# Patient Record
Sex: FEMALE | Race: WHITE | NOT HISPANIC OR LATINO | Employment: FULL TIME | URBAN - METROPOLITAN AREA
[De-identification: names, ages, dates, MRNs, and addresses within clinical notes are randomized per-mention and may not be internally consistent; named-entity substitution may affect disease eponyms.]

---

## 2017-01-01 ENCOUNTER — HOSPITAL ENCOUNTER (EMERGENCY)
Facility: CLINIC | Age: 27
Discharge: HOME | End: 2017-01-01
Attending: EMERGENCY MEDICINE | Admitting: EMERGENCY MEDICINE
Payer: COMMERCIAL

## 2017-01-01 ENCOUNTER — APPOINTMENT (OUTPATIENT)
Dept: CT IMAGING | Facility: CLINIC | Age: 27
End: 2017-01-01
Attending: EMERGENCY MEDICINE
Payer: COMMERCIAL

## 2017-01-01 VITALS
TEMPERATURE: 97.5 F | OXYGEN SATURATION: 98 % | BODY MASS INDEX: 29.26 KG/M2 | DIASTOLIC BLOOD PRESSURE: 70 MMHG | WEIGHT: 160 LBS | SYSTOLIC BLOOD PRESSURE: 107 MMHG | RESPIRATION RATE: 18 BRPM | HEART RATE: 105 BPM

## 2017-01-01 DIAGNOSIS — R11.2 NON-INTRACTABLE VOMITING WITH NAUSEA, UNSPECIFIED VOMITING TYPE: ICD-10-CM

## 2017-01-01 DIAGNOSIS — R11.10 VOMITING AND DIARRHEA: ICD-10-CM

## 2017-01-01 DIAGNOSIS — R10.30 LOWER ABDOMINAL PAIN: ICD-10-CM

## 2017-01-01 DIAGNOSIS — R19.7 VOMITING AND DIARRHEA: ICD-10-CM

## 2017-01-01 LAB
ALBUMIN UR-MCNC: 10 MG/DL
ANION GAP SERPL CALCULATED.3IONS-SCNC: 9 MMOL/L (ref 3–14)
APPEARANCE UR: ABNORMAL
BACTERIA #/AREA URNS HPF: ABNORMAL /HPF
BASOPHILS # BLD AUTO: 0 10E9/L (ref 0–0.2)
BASOPHILS NFR BLD AUTO: 0.3 %
BILIRUB UR QL STRIP: NEGATIVE
BUN SERPL-MCNC: 15 MG/DL (ref 7–30)
CALCIUM SERPL-MCNC: 8.6 MG/DL (ref 8.5–10.1)
CHLORIDE SERPL-SCNC: 100 MMOL/L (ref 94–109)
CO2 SERPL-SCNC: 28 MMOL/L (ref 20–32)
COLOR UR AUTO: YELLOW
CREAT SERPL-MCNC: 0.8 MG/DL (ref 0.52–1.04)
DIFFERENTIAL METHOD BLD: ABNORMAL
EOSINOPHIL # BLD AUTO: 0.1 10E9/L (ref 0–0.7)
EOSINOPHIL NFR BLD AUTO: 0.6 %
ERYTHROCYTE [DISTWIDTH] IN BLOOD BY AUTOMATED COUNT: 12.3 % (ref 10–15)
GFR SERPL CREATININE-BSD FRML MDRD: 87 ML/MIN/1.7M2
GLUCOSE SERPL-MCNC: 95 MG/DL (ref 70–99)
GLUCOSE UR STRIP-MCNC: NEGATIVE MG/DL
HCG UR QL: NEGATIVE
HCT VFR BLD AUTO: 43.9 % (ref 35–47)
HGB BLD-MCNC: 14.6 G/DL (ref 11.7–15.7)
HGB UR QL STRIP: NEGATIVE
IMM GRANULOCYTES # BLD: 0.1 10E9/L (ref 0–0.4)
IMM GRANULOCYTES NFR BLD: 0.4 %
KETONES UR STRIP-MCNC: 5 MG/DL
LEUKOCYTE ESTERASE UR QL STRIP: ABNORMAL
LYMPHOCYTES # BLD AUTO: 0.5 10E9/L (ref 0.8–5.3)
LYMPHOCYTES NFR BLD AUTO: 4.1 %
MCH RBC QN AUTO: 31.5 PG (ref 26.5–33)
MCHC RBC AUTO-ENTMCNC: 33.3 G/DL (ref 31.5–36.5)
MCV RBC AUTO: 95 FL (ref 78–100)
MONOCYTES # BLD AUTO: 0.3 10E9/L (ref 0–1.3)
MONOCYTES NFR BLD AUTO: 3 %
MUCOUS THREADS #/AREA URNS LPF: PRESENT /LPF
NEUTROPHILS # BLD AUTO: 10.5 10E9/L (ref 1.6–8.3)
NEUTROPHILS NFR BLD AUTO: 91.6 %
NITRATE UR QL: NEGATIVE
NRBC # BLD AUTO: 0 10*3/UL
NRBC BLD AUTO-RTO: 0 /100
PH UR STRIP: 5.5 PH (ref 5–7)
PLATELET # BLD AUTO: 248 10E9/L (ref 150–450)
POTASSIUM SERPL-SCNC: 3.7 MMOL/L (ref 3.4–5.3)
RBC # BLD AUTO: 4.63 10E12/L (ref 3.8–5.2)
RBC #/AREA URNS AUTO: 5 /HPF (ref 0–2)
SODIUM SERPL-SCNC: 137 MMOL/L (ref 133–144)
SP GR UR STRIP: 1.02 (ref 1–1.03)
SQUAMOUS #/AREA URNS AUTO: 6 /HPF (ref 0–1)
URN SPEC COLLECT METH UR: ABNORMAL
UROBILINOGEN UR STRIP-MCNC: NORMAL MG/DL (ref 0–2)
WBC # BLD AUTO: 11.5 10E9/L (ref 4–11)
WBC #/AREA URNS AUTO: 3 /HPF (ref 0–2)

## 2017-01-01 PROCEDURE — 96374 THER/PROPH/DIAG INJ IV PUSH: CPT

## 2017-01-01 PROCEDURE — 96361 HYDRATE IV INFUSION ADD-ON: CPT

## 2017-01-01 PROCEDURE — 81025 URINE PREGNANCY TEST: CPT | Performed by: EMERGENCY MEDICINE

## 2017-01-01 PROCEDURE — 85025 COMPLETE CBC W/AUTO DIFF WBC: CPT | Performed by: EMERGENCY MEDICINE

## 2017-01-01 PROCEDURE — 25000125 ZZHC RX 250: Performed by: EMERGENCY MEDICINE

## 2017-01-01 PROCEDURE — 99285 EMERGENCY DEPT VISIT HI MDM: CPT | Mod: 25

## 2017-01-01 PROCEDURE — 96375 TX/PRO/DX INJ NEW DRUG ADDON: CPT

## 2017-01-01 PROCEDURE — 25500064 ZZH RX 255 OP 636: Performed by: EMERGENCY MEDICINE

## 2017-01-01 PROCEDURE — 25000128 H RX IP 250 OP 636: Performed by: EMERGENCY MEDICINE

## 2017-01-01 PROCEDURE — 81001 URINALYSIS AUTO W/SCOPE: CPT | Performed by: EMERGENCY MEDICINE

## 2017-01-01 PROCEDURE — 80048 BASIC METABOLIC PNL TOTAL CA: CPT | Performed by: EMERGENCY MEDICINE

## 2017-01-01 PROCEDURE — 74177 CT ABD & PELVIS W/CONTRAST: CPT

## 2017-01-01 RX ORDER — HYDROMORPHONE HYDROCHLORIDE 1 MG/ML
0.5 INJECTION, SOLUTION INTRAMUSCULAR; INTRAVENOUS; SUBCUTANEOUS ONCE
Status: COMPLETED | OUTPATIENT
Start: 2017-01-01 | End: 2017-01-01

## 2017-01-01 RX ORDER — ONDANSETRON 2 MG/ML
4 INJECTION INTRAMUSCULAR; INTRAVENOUS ONCE
Status: COMPLETED | OUTPATIENT
Start: 2017-01-01 | End: 2017-01-01

## 2017-01-01 RX ORDER — ONDANSETRON 4 MG/1
4 TABLET, ORALLY DISINTEGRATING ORAL EVERY 8 HOURS PRN
Qty: 10 TABLET | Refills: 0 | Status: SHIPPED | OUTPATIENT
Start: 2017-01-01 | End: 2017-01-04

## 2017-01-01 RX ORDER — IOPAMIDOL 755 MG/ML
500 INJECTION, SOLUTION INTRAVASCULAR ONCE
Status: COMPLETED | OUTPATIENT
Start: 2017-01-01 | End: 2017-01-01

## 2017-01-01 RX ADMIN — SODIUM CHLORIDE 60 ML: 9 INJECTION, SOLUTION INTRAVENOUS at 22:13

## 2017-01-01 RX ADMIN — SODIUM CHLORIDE 1000 ML: 9 INJECTION, SOLUTION INTRAVENOUS at 21:09

## 2017-01-01 RX ADMIN — HYDROMORPHONE HYDROCHLORIDE 0.5 MG: 1 INJECTION, SOLUTION INTRAMUSCULAR; INTRAVENOUS; SUBCUTANEOUS at 21:09

## 2017-01-01 RX ADMIN — IOPAMIDOL 81 ML: 755 INJECTION, SOLUTION INTRAVENOUS at 22:13

## 2017-01-01 RX ADMIN — ONDANSETRON 4 MG: 2 INJECTION INTRAMUSCULAR; INTRAVENOUS at 21:09

## 2017-01-01 ASSESSMENT — ENCOUNTER SYMPTOMS
NAUSEA: 1
DIARRHEA: 1
VOMITING: 1
FEVER: 0
BLOOD IN STOOL: 0
ABDOMINAL PAIN: 1
DYSURIA: 0

## 2017-01-01 NOTE — ED AVS SNAPSHOT
[facility] Emergency Department    [address]    [facility] [address]    Phone:  [phone]    Fax:  [phone]                                       Althea Stewart   MRN: [id number]    Department:  [facility] Emergency Department   Date of Visit:  1/1/2017           After Visit Summary Signature Page     I have received my discharge instructions, and my questions have been answered. I have discussed any challenges I see with this plan with the nurse or doctor.    ..........................................................................................................................................  Patient/Patient Representative Signature      ..........................................................................................................................................  Patient Representative Print Name and Relationship to Patient    ..................................................               ................................................  Date                                            Time    ..........................................................................................................................................  Reviewed by Signature/Title    ...................................................              ..............................................  Date                                                            Time

## 2017-01-01 NOTE — ED AVS SNAPSHOT
[facility] Emergency Department    [address]    [facility] [address]    Phone:  [phone]    Fax:  [phone]                                       Althea Stewart   MRN: [id number]    Department:  [facility] Emergency Department   Date of Visit:  1/1/2017           Patient Information     Date Of Birth          1990        Your diagnoses for this visit were:     Non-intractable vomiting with nausea, unspecified vomiting type     Vomiting and diarrhea     Lower abdominal pain        You were seen by Daisha Blanco MD.      Follow-up Information     Follow up with Shirley Siu MD In 3 days.    Specialty:  Internal Medicine    Contact information:    [facility]  [address]  [facility] [id number] [phone]          Discharge Instructions       Please follow up closely with your regular physician. Please return to the ED if your symptoms worsen or if you develop new or concerning symptoms.       Discharge Instructions  Vomiting    You have been seen today for vomiting. This is usually caused by a virus, but some bacteria, parasites, medicines or other medical conditions can cause similar symptoms. At this time your doctor does not find that your vomiting is a sign of anything dangerous or life-threatening. However, sometimes the signs of serious illness do not show up right away. If you have new or worse symptoms, you may need to be seen again in the emergency department or by your primary doctor. Remember that serious problems like appendicitis can start as vomiting.     Return to the Emergency Department if:    You keep throwing up and you are not able to keep liquids down.     You feel you are getting dehydrated, such as being very thirsty, not urinating at least every 8-12 hours, or feeling faint or lightheaded.     You develop a new fever, or your fever continues for more than 2 days.     You have belly pain that  seems worse than cramps, is in one spot, or is getting worse over time.     You have blood in your vomit or stools.     You feel very weak.    You are not starting to improve within 24 hours of your visit here.     What can I do to help myself?    The most important thing to do is to drink clear liquids. If you have been vomiting a lot, it is best to have only small, frequent sips of liquids. Drinking too much at once may cause more vomiting. If you are vomiting often, you must replace minerals, sodium and potassium lost with your illness. Pedialyte  and sports drinks can help you replace these minerals. You can also drink clear liquids such as water, weak tea, apple juice, and 7-Up . Avoid acid liquids (orange), caffeine (coffee) or alcohol. Do not drink milk until you no longer have diarrhea.     After liquids are staying down, you may start eating mild foods. Soda crackers, toast, plain noodles, gelatin, applesauce and bananas are good first choices. Avoid foods that have acid, are spicy, fatty or have a lot of fiber (such as meats, coarse grains, vegetables). You may start eating these foods again in about 3 days when you are better.     Sometimes treatment includes prescription medicine to prevent nausea and vomiting. If your doctor prescribes these for you, take them as directed.     Don t take ibuprofen, or other nonsteroidal anti-inflammatory medicines without checking with your healthcare provider.   If you were given a prescription for medicine here today, be sure to read all of the information (including the package insert) that comes with your prescription.  This will include important information about the medicine, its side effects, and any warnings that you need to know about.  The pharmacist who fills the prescription can provide more information and answer questions you may have about the medicine.  If you have questions or concerns that the pharmacist cannot address, please call or return to the  Emergency Department.       Opioid Medication Information    Pain medications are among the most commonly prescribed medicines, so we are including this information for all our patients. If you did not receive pain medication or get a prescription for pain medicine, you can ignore it.     You may have been given a prescription for an opioid (narcotic) pain medicine and/or have received a pain medicine while here in the Emergency Department. These medicines can make you drowsy or impaired. You must not drive, operate dangerous equipment, or engage in any other dangerous activities while taking these medications. If you drive while taking these medications, you could be arrested for DUI, or driving under the influence. Do not drink any alcohol while you are taking these medications.     Opioid pain medications can cause addiction. If you have a history of chemical dependency of any type, you are at a higher risk of becoming addicted to pain medications.  Only take these prescribed medications to treat your pain when all other options have been tried. Take it for as short a time and as few doses as possible. Store your pain pills in a secure place, as they are frequently stolen and provide a dangerous opportunity for children or visitors in your house to start abusing these powerful medications. We will not replace any lost or stolen medicine.  As soon as your pain is better, you should flush all your remaining medication.     Many prescription pain medications contain Tylenol  (acetaminophen), including Vicodin , Tylenol #3 , Norco , Lortab , and Percocet .  You should not take any extra pills of Tylenol  if you are using these prescription medications or you can get very sick.  Do not ever take more than 3000 mg of acetaminophen in any 24 hour period.    All opioids tend to cause constipation. Drink plenty of water and eat foods that have a lot of fiber, such as fruits, vegetables, prune juice, apple juice and high  fiber cereal.  Take a laxative if you don t move your bowels at least every other day. Miralax , Milk of Magnesia, Colace , or Senna  can be used to keep you regular.      Remember that you can always come back to the Emergency Department if you are not able to see your regular doctor in the amount of time listed above, if you get any new symptoms, or if there is anything that worries you.      Discharge Instructions  Adult Diarrhea    You have been seen today for diarrhea. This is usually caused by a virus, but some bacteria, parasites, medicines or other medical conditions can cause similar symptoms. At this time your doctor does not find that your diarrhea is a sign of anything dangerous or life-threatening. However, sometimes the signs of serious illness do not show up right away. If you have new or worse symptoms, you may need to be seen again in the Emergency Department or by your primary doctor.     Return to the Emergency Department if:    You feel you are getting dehydrated, such as being very thirsty, not urinating at least every 8-12 hours, or feeling faint or lightheaded.     You develop a new fever, or your fever continues for more than 2 days.     You have belly pain that seems worse than cramps, is in one spot, or is getting worse over time.     You have blood in your stool or your stool becomes black.  (Remember that if you take Pepto-Bismol , this will turn your stool black).     You feel very weak.    You are not starting to improve within 24 hours of your visit here.    What can I do to help myself?    The most important thing to do is to drink clear liquids.   It is best to have only small, frequent sips of liquids. Drinking too much at once may cause more diarrhea. You should also replace minerals, sodium and potassium lost with diarrhea. Pedialyte  and sports drinks can help you replace these minerals. You can also drink clear liquids such as water, weak tea, apple juice, and 7-Up . Avoid acid  "liquids (orange), caffeine (coffee) or alcohol. Milk products will make the diarrhea worse.     Eat only bland foods. Soda crackers, toast, plain noodles, gelatin, applesauce and bananas are good first choices. Avoid foods that have acid, are spicy, fatty or fibrous (such as meats, coarse grains, vegetables). You may start eating these foods again in about 3 days when you are better.     Sometimes treatment includes prescription medicine to prevent diarrhea. If your doctor prescribes these for you, take them as directed.     Nonprescription medicine is available for the treatment of diarrhea and can be very effective. If you use it, make sure you use the dose recommended on the package. Check with your healthcare provider before you use any medicine for diarrhea.     Don t take ibuprofen, or other nonsteroidal anti-inflammatory medicines without checking with your healthcare provider.   Probiotics: If you have been given an antibiotic, you may want to also take a probiotic pill or eat yogurt with live cultures. Probiotics have \"good bacteria\" to help your intestines stay healthy. Studies have shown that probiotics help prevent diarrhea and other intestine problems (including C. diff infection) when you take antibiotics. You can buy these without a prescription in the pharmacy section of the store.   If you were given a prescription for medicine here today, be sure to read all of the information (including the package insert) that comes with your prescription.  This will include important information about the medicine, its side effects, and any warnings that you need to know about.  The pharmacist who fills the prescription can provide more information and answer questions you may have about the medicine.  If you have questions or concerns that the pharmacist cannot address, please call or return to the Emergency Department.   Opioid Medication Information    Pain medications are among the most commonly prescribed " medicines, so we are including this information for all our patients. If you did not receive pain medication or get a prescription for pain medicine, you can ignore it.     You may have been given a prescription for an opioid (narcotic) pain medicine and/or have received a pain medicine while here in the Emergency Department. These medicines can make you drowsy or impaired. You must not drive, operate dangerous equipment, or engage in any other dangerous activities while taking these medications. If you drive while taking these medications, you could be arrested for DUI, or driving under the influence. Do not drink any alcohol while you are taking these medications.     Opioid pain medications can cause addiction. If you have a history of chemical dependency of any type, you are at a higher risk of becoming addicted to pain medications.  Only take these prescribed medications to treat your pain when all other options have been tried. Take it for as short a time and as few doses as possible. Store your pain pills in a secure place, as they are frequently stolen and provide a dangerous opportunity for children or visitors in your house to start abusing these powerful medications. We will not replace any lost or stolen medicine.  As soon as your pain is better, you should flush all your remaining medication.     Many prescription pain medications contain Tylenol  (acetaminophen), including Vicodin , Tylenol #3 , Norco , Lortab , and Percocet .  You should not take any extra pills of Tylenol  if you are using these prescription medications or you can get very sick.  Do not ever take more than 3000 mg of acetaminophen in any 24 hour period.    All opioids tend to cause constipation. Drink plenty of water and eat foods that have a lot of fiber, such as fruits, vegetables, prune juice, apple juice and high fiber cereal.  Take a laxative if you don t move your bowels at least every other day. Miralax , Milk of Magnesia,  Colace , or Senna  can be used to keep you regular.      Remember that you can always come back to the Emergency Department if you are not able to see your regular doctor in the amount of time listed above, if you get any new symptoms, or if there is anything that worries you.    Discharge Instructions  Abdominal Pain    Abdominal pain can be caused by many things. Your evaluation today does not show the exact cause for your pain. Your doctor today has decided that it is unlikely your pain is due to a life threatening problem, or a problem requiring surgery or hospital admission. Sometimes those problems cannot be found right away, so it is very important that you follow up as directed.  Sometimes only the changes which occur over time allow the cause of your pain to be found.    Return to the Emergency Department for a recheck in 8-12 hours if your pain continues.  If your pain gets worse, changes in location, or feels different, return to the Emergency Department right away.    ADULTS:  Return to the Emergency Department right away if:      You get an oral temperature above 102oF or as directed by your doctor.    You have blood in your stools (bright red or black, tarry stools).    You keep throwing up or can t drink liquids.    You see blood when you throw up.    You can t have a bowel movement or you can t pass gas.    Your stomach gets bloated or bigger.    Your skin or the whites of your eyes look yellow.    You faint.    You have bloody, frequent or painful urination.    You have new symptoms or anything that worries you.    CHILDREN:  Return to the Emergency Department right away if your child has any of the above-listed symptoms or the following:      Pushes your hand away or screams/cries when his/her belly is touched.    You notice your child is very fussy or weak.    Your child is very tired and is too tired to eat or drink.    Your child is dehydrated.  Signs of dehydration can be:  o Your infant has  had no wet diapers in 4-5 hours.  o Your older child has not passed urine in 6-8 hours.  o Your infant or child starts to have dry mouth and lips, or no saliva or tears.    PREGNANT WOMEN:  Return to the Emergency Department right away if you have any of the above-listed symptoms or the following:      You have bleeding, leaking fluid or passing tissue from the vagina.    You have worse pain or cramping, or pain in your shoulder or back.    You have vomiting that will not stop.    You have painful or bloody urination.    You have a temperature of 100oF or more.    Your baby is not moving as much as usual.    You faint.    You get a bad headache with or without eye problems and abdominal pain.    You have a convulsion or seizure.    You have unusual discharge from your vagina and abdominal pain.    Abdominal pain is pretty common during pregnancy.  Your pain may or may not be related to your pregnancy. You should follow-up closely with your OB doctor so they can evaluate you and your baby.  Until you follow-up with your regular doctor, do the following:       Avoid sex and do not put anything in your vagina.    Drink clear fluids.    Only take medications approved by your doctor.    MORE INFORMATION:    Appendicitis:  A possible cause of abdominal pain in any person who still has their appendix is acute appendicitis. Appendicitis is often hard to diagnose.  Testing does not always rule out early appendicitis or other causes of abdominal pain. Close follow-up with your doctor and re-evaluations may be needed to figure out the reason for your abdominal pain.    Follow-up:  It is very important that you make an appointment with your clinic and go to the appointment.  If you do not follow-up with your primary doctor, it may result in missing an important development which could result in permanent injury or disability and/or lasting pain.  If there is any problem keeping your appointment, call your doctor or return to  "the Emergency Department.    Medications:  Take your medications as directed by your doctor today.  Before using over-the-counter medications, ask your doctor and make sure to take the medications as directed.  If you have any questions about medications, ask your doctor.    Diet:  Resume your normal diet as much as possible, but do not eat fried, fatty or spicy foods while you have pain.  Do not drink alcohol or have caffeine.  Do not smoke tobacco.    Probiotics: If you have been given an antibiotic, you may want to also take a probiotic pill or eat yogurt with live cultures. Probiotics have \"good bacteria\" to help your intestines stay healthy. Studies have shown that probiotics help prevent diarrhea and other intestine problems (including C. diff infection) when you take antibiotics. You can buy these without a prescription in the pharmacy section of the store.     If you were given a prescription for medicine here today, be sure to read all of the information (including the package insert) that comes with your prescription.  This will include important information about the medicine, its side effects, and any warnings that you need to know about.  The pharmacist who fills the prescription can provide more information and answer questions you may have about the medicine.  If you have questions or concerns that the pharmacist cannot address, please call or return to the Emergency Department.         Opioid Medication Information    Pain medications are among the most commonly prescribed medicines, so we are including this information for all our patients. If you did not receive pain medication or get a prescription for pain medicine, you can ignore it.     You may have been given a prescription for an opioid (narcotic) pain medicine and/or have received a pain medicine while here in the Emergency Department. These medicines can make you drowsy or impaired. You must not drive, operate dangerous equipment, or engage " in any other dangerous activities while taking these medications. If you drive while taking these medications, you could be arrested for DUI, or driving under the influence. Do not drink any alcohol while you are taking these medications.     Opioid pain medications can cause addiction. If you have a history of chemical dependency of any type, you are at a higher risk of becoming addicted to pain medications.  Only take these prescribed medications to treat your pain when all other options have been tried. Take it for as short a time and as few doses as possible. Store your pain pills in a secure place, as they are frequently stolen and provide a dangerous opportunity for children or visitors in your house to start abusing these powerful medications. We will not replace any lost or stolen medicine.  As soon as your pain is better, you should flush all your remaining medication.     Many prescription pain medications contain Tylenol  (acetaminophen), including Vicodin , Tylenol #3 , Norco , Lortab , and Percocet .  You should not take any extra pills of Tylenol  if you are using these prescription medications or you can get very sick.  Do not ever take more than 3000 mg of acetaminophen in any 24 hour period.    All opioids tend to cause constipation. Drink plenty of water and eat foods that have a lot of fiber, such as fruits, vegetables, prune juice, apple juice and high fiber cereal.  Take a laxative if you don t move your bowels at least every other day. Miralax , Milk of Magnesia, Colace , or Senna  can be used to keep you regular.      Remember that you can always come back to the Emergency Department if you are not able to see your regular doctor in the amount of time listed above, if you get any new symptoms, or if there is anything that worries you.          24 Hour Appointment Hotline       To make an appointment at any [facility], call [phone] ([phone]). If you don't have a family doctor  or clinic, we will help you find one. [facility] are conveniently located to serve the needs of you and your family.             Review of your medicines      START taking        Dose / Directions Last dose taken    ondansetron 4 MG ODT tab   Commonly known as:  ZOFRAN ODT   Dose:  4 mg   Quantity:  10 tablet        Take 1 tablet (4 mg) by mouth every 8 hours as needed   Refills:  0          Our records show that you are taking the medicines listed below. If these are incorrect, please call your family doctor or clinic.        Dose / Directions Last dose taken    albuterol 90 MCG/ACT inhaler   Dose:  1-2 puff   Quantity:  3 Inhaler        Inhale 1-2 puffs into the lungs every 6 hours as needed for shortness of breath / dyspnea.   Refills:  3        glycopyrrolate 1 MG tablet   Commonly known as:  ROBINUL        Refills:  11        norgestrel-ethinyl estradiol 0.3-30 MG-MCG per tablet   Commonly known as:  LO/OVRAL   Dose:  1 tablet   Quantity:  84 tablet        Take 1 tablet by mouth daily   Refills:  1        PRISTIQ 50 MG 24 hr tablet   Dose:  50 mg   Generic drug:  desvenlafaxine succinate ER        Take 1 tablet by mouth daily.   Refills:  0        spironolactone 50 MG tablet   Commonly known as:  ALDACTONE        Refills:  3        SYMBICORT 80-4.5 MCG/ACT Inhaler   Dose:  2 puff   Quantity:  3 Inhaler   Generic drug:  budesonide-formoterol        Inhale 2 puffs into the lungs 2 times daily   Refills:  3        TAZORAC 0.05 % Crea cream   Generic drug:  tazarotene        Apply topically daily   Refills:  0                Prescriptions were sent or printed at these locations (1 Prescription)                   Other Prescriptions                Printed at Department/Unit printer (1 of 1)         ondansetron (ZOFRAN ODT) 4 MG ODT tab                Procedures and tests performed during your visit     Basic metabolic panel (BMP)    CBC + differential    CT Abdomen Pelvis w Contrast    HCG qualitative urine     UA with Microscopic      Orders Needing Specimen Collection     None      Pending Results     No orders found from 12/31/2016 to 1/2/2017.       Test Results from your hospital stay           1/1/2017  9:18 PM - Interface, Flexilab Results      Component Results     Component Value Ref Range & Units Status    WBC 11.5 (H) 4.0 - 11.0 10e9/L Final    RBC Count 4.63 3.8 - 5.2 10e12/L Final    Hemoglobin 14.6 11.7 - 15.7 g/dL Final    Hematocrit 43.9 35.0 - 47.0 % Final    MCV 95 78 - 100 fl Final    MCH 31.5 26.5 - 33.0 pg Final    MCHC 33.3 31.5 - 36.5 g/dL Final    RDW 12.3 10.0 - 15.0 % Final    Platelet Count 248 150 - 450 10e9/L Final    Diff Method Automated Method  Final    % Neutrophils 91.6 % Final    % Lymphocytes 4.1 % Final    % Monocytes 3.0 % Final    % Eosinophils 0.6 % Final    % Basophils 0.3 % Final    % Immature Granulocytes 0.4 % Final    Nucleated RBCs 0 0 /100 Final    Absolute Neutrophil 10.5 (H) 1.6 - 8.3 10e9/L Final    Absolute Lymphocytes 0.5 (L) 0.8 - 5.3 10e9/L Final    Absolute Monocytes 0.3 0.0 - 1.3 10e9/L Final    Absolute Eosinophils 0.1 0.0 - 0.7 10e9/L Final    Absolute Basophils 0.0 0.0 - 0.2 10e9/L Final    Abs Immature Granulocytes 0.1 0 - 0.4 10e9/L Final    Absolute Nucleated RBC 0.0  Final         1/1/2017  9:39 PM - Interface, Flexilab Results      Component Results     Component Value Ref Range & Units Status    Sodium 137 133 - 144 mmol/L Final    Potassium 3.7 3.4 - 5.3 mmol/L Final    Chloride 100 94 - 109 mmol/L Final    Carbon Dioxide 28 20 - 32 mmol/L Final    Anion Gap 9 3 - 14 mmol/L Final    Glucose 95 70 - 99 mg/dL Final    Urea Nitrogen 15 7 - 30 mg/dL Final    Creatinine 0.80 0.52 - 1.04 mg/dL Final    GFR Estimate 87 >60 mL/min/1.7m2 Final    Non  GFR Calc    GFR Estimate If Black >90   GFR Calc   >60 mL/min/1.7m2 Final    Calcium 8.6 8.5 - 10.1 mg/dL Final         1/1/2017  9:43 PM - Interface, Flexilab Results      Component  Results     Component Value Ref Range & Units Status    Color Urine Yellow  Final    Appearance Urine Slightly Cloudy  Final    Glucose Urine Negative NEG mg/dL Final    Bilirubin Urine Negative NEG Final    Ketones Urine 5 (A) NEG mg/dL Final    Specific Gravity Urine 1.024 1.003 - 1.035 Final    Blood Urine Negative NEG Final    pH Urine 5.5 5.0 - 7.0 pH Final    Protein Albumin Urine 10 (A) NEG mg/dL Final    Urobilinogen mg/dL Normal 0.0 - 2.0 mg/dL Final    Nitrite Urine Negative NEG Final    Leukocyte Esterase Urine Trace (A) NEG Final    Source [facility] Urine  Final    WBC Urine 3 (H) 0 - 2 /HPF Final    RBC Urine 5 (H) 0 - 2 /HPF Final    Bacteria Urine Many (A) NEG /HPF Final    Squamous Epithelial /HPF Urine 6 (H) 0 - 1 /HPF Final    Mucous Urine Present (A) NEG /LPF Final         1/1/2017  9:42 PM - Interface, Flexilab Results      Component Results     Component Value Ref Range & Units Status    HCG Qual Urine Negative NEG Final         1/1/2017 10:43 PM - Interface, Radiant Ib      Narrative     CT ABDOMEN AND PELVIS WITH CONTRAST 1/1/2017 10:21 PM     HISTORY: Lower abdominal pain, nausea, vomiting, diarrhea.     COMPARISON: None.    TECHNIQUE: Volumetric helical acquisition of CT images from the lung  bases through the symphysis pubis after the administration of 81mL  Isovue-370 intravenous contrast. Radiation dose for this scan was  reduced using automated exposure control, adjustment of the mA and/or  kV according to patient size, or iterative reconstruction technique.    FINDINGS: The liver, bilateral kidneys and adrenal glands, pancreas,  and spleen demonstrate no worrisome focal lesion. The appendix is not  definitively identified, however no dilated tubular structures or  inflammatory changes in the expected location of the appendix are  evident. No hydronephrosis. Unremarkable gallbladder. Normal caliber  aorta. No dissection. There is no free fluid in the abdomen or pelvis.  No free air in  the abdomen. Bone windows reveal no destructive  lesions. There are no abdominal or pelvic lymph nodes that are  abnormal by size criteria. The visualized lung bases are unremarkable.  There are no dilated loops of small bowel or colon.        Impression     IMPRESSION: No acute process demonstrated within the abdomen and  pelvis.    KOMAL ESPINOZA MD                Clinical Quality Measure: Blood Pressure Screening     Your blood pressure was checked while you were in the emergency department today. The last reading we obtained was  BP: 108/68 mmHg . Please read the guidelines below about what these numbers mean and what you should do about them.  If your systolic blood pressure (the top number) is less than 120 and your diastolic blood pressure (the bottom number) is less than 80, then your blood pressure is normal. There is nothing more that you need to do about it.  If your systolic blood pressure (the top number) is 120-139 or your diastolic blood pressure (the bottom number) is 80-89, your blood pressure may be higher than it should be. You should have your blood pressure rechecked within a year by a primary care provider.  If your systolic blood pressure (the top number) is 140 or greater or your diastolic blood pressure (the bottom number) is 90 or greater, you may have high blood pressure. High blood pressure is treatable, but if left untreated over time it can put you at risk for heart attack, stroke, or kidney failure. You should have your blood pressure rechecked by a primary care provider within the next 4 weeks.  If your provider in the emergency department today gave you specific instructions to follow-up with your doctor or provider even sooner than that, you should follow that instruction and not wait for up to 4 weeks for your follow-up visit.        Thank you for choosing [facility]       Thank you for choosing [facility] for your care. Our goal is always to provide you with excellent care. Hearing  "back from our patients is one way we can continue to improve our services. Please take a few minutes to complete the written survey that you may receive in the mail after you visit with us. Thank you!        [facility] Information     [facility] lets you send messages to your doctor, view your test results, renew your prescriptions, schedule appointments and more. To sign up, go to [email or web address] . Click on \"Log in\" on the left side of the screen, which will take you to the Welcome page. Then click on \"Sign up Now\" on the right side of the page.     You will be asked to enter the access code listed below, as well as some personal information. Please follow the directions to create your username and password.     Your access code is: [email or web address]  Expires: 2017 11:42 PM     Your access code will  in 90 days. If you need help or a new code, please call your [place] clinic or [phone].        After Visit Summary       This is your record. Keep this with you and show to your community pharmacist(s) and doctor(s) at your next visit.                  "

## 2017-01-02 NOTE — DISCHARGE INSTRUCTIONS
Please follow up closely with your regular physician. Please return to the ED if your symptoms worsen or if you develop new or concerning symptoms.       Discharge Instructions  Vomiting    You have been seen today for vomiting. This is usually caused by a virus, but some bacteria, parasites, medicines or other medical conditions can cause similar symptoms. At this time your doctor does not find that your vomiting is a sign of anything dangerous or life-threatening. However, sometimes the signs of serious illness do not show up right away. If you have new or worse symptoms, you may need to be seen again in the emergency department or by your primary doctor. Remember that serious problems like appendicitis can start as vomiting.     Return to the Emergency Department if:    You keep throwing up and you are not able to keep liquids down.     You feel you are getting dehydrated, such as being very thirsty, not urinating at least every 8-12 hours, or feeling faint or lightheaded.     You develop a new fever, or your fever continues for more than 2 days.     You have belly pain that seems worse than cramps, is in one spot, or is getting worse over time.     You have blood in your vomit or stools.     You feel very weak.    You are not starting to improve within 24 hours of your visit here.     What can I do to help myself?    The most important thing to do is to drink clear liquids. If you have been vomiting a lot, it is best to have only small, frequent sips of liquids. Drinking too much at once may cause more vomiting. If you are vomiting often, you must replace minerals, sodium and potassium lost with your illness. Pedialyte  and sports drinks can help you replace these minerals. You can also drink clear liquids such as water, weak tea, apple juice, and 7-Up . Avoid acid liquids (orange), caffeine (coffee) or alcohol. Do not drink milk until you no longer have diarrhea.     After liquids are staying down, you may  start eating mild foods. Soda crackers, toast, plain noodles, gelatin, applesauce and bananas are good first choices. Avoid foods that have acid, are spicy, fatty or have a lot of fiber (such as meats, coarse grains, vegetables). You may start eating these foods again in about 3 days when you are better.     Sometimes treatment includes prescription medicine to prevent nausea and vomiting. If your doctor prescribes these for you, take them as directed.     Don t take ibuprofen, or other nonsteroidal anti-inflammatory medicines without checking with your healthcare provider.   If you were given a prescription for medicine here today, be sure to read all of the information (including the package insert) that comes with your prescription.  This will include important information about the medicine, its side effects, and any warnings that you need to know about.  The pharmacist who fills the prescription can provide more information and answer questions you may have about the medicine.  If you have questions or concerns that the pharmacist cannot address, please call or return to the Emergency Department.       Opioid Medication Information    Pain medications are among the most commonly prescribed medicines, so we are including this information for all our patients. If you did not receive pain medication or get a prescription for pain medicine, you can ignore it.     You may have been given a prescription for an opioid (narcotic) pain medicine and/or have received a pain medicine while here in the Emergency Department. These medicines can make you drowsy or impaired. You must not drive, operate dangerous equipment, or engage in any other dangerous activities while taking these medications. If you drive while taking these medications, you could be arrested for DUI, or driving under the influence. Do not drink any alcohol while you are taking these medications.     Opioid pain medications can cause addiction. If you have  a history of chemical dependency of any type, you are at a higher risk of becoming addicted to pain medications.  Only take these prescribed medications to treat your pain when all other options have been tried. Take it for as short a time and as few doses as possible. Store your pain pills in a secure place, as they are frequently stolen and provide a dangerous opportunity for children or visitors in your house to start abusing these powerful medications. We will not replace any lost or stolen medicine.  As soon as your pain is better, you should flush all your remaining medication.     Many prescription pain medications contain Tylenol  (acetaminophen), including Vicodin , Tylenol #3 , Norco , Lortab , and Percocet .  You should not take any extra pills of Tylenol  if you are using these prescription medications or you can get very sick.  Do not ever take more than 3000 mg of acetaminophen in any 24 hour period.    All opioids tend to cause constipation. Drink plenty of water and eat foods that have a lot of fiber, such as fruits, vegetables, prune juice, apple juice and high fiber cereal.  Take a laxative if you don t move your bowels at least every other day. Miralax , Milk of Magnesia, Colace , or Senna  can be used to keep you regular.      Remember that you can always come back to the Emergency Department if you are not able to see your regular doctor in the amount of time listed above, if you get any new symptoms, or if there is anything that worries you.      Discharge Instructions  Adult Diarrhea    You have been seen today for diarrhea. This is usually caused by a virus, but some bacteria, parasites, medicines or other medical conditions can cause similar symptoms. At this time your doctor does not find that your diarrhea is a sign of anything dangerous or life-threatening. However, sometimes the signs of serious illness do not show up right away. If you have new or worse symptoms, you may need to be seen  again in the Emergency Department or by your primary doctor.     Return to the Emergency Department if:    You feel you are getting dehydrated, such as being very thirsty, not urinating at least every 8-12 hours, or feeling faint or lightheaded.     You develop a new fever, or your fever continues for more than 2 days.     You have belly pain that seems worse than cramps, is in one spot, or is getting worse over time.     You have blood in your stool or your stool becomes black.  (Remember that if you take Pepto-Bismol , this will turn your stool black).     You feel very weak.    You are not starting to improve within 24 hours of your visit here.    What can I do to help myself?    The most important thing to do is to drink clear liquids.   It is best to have only small, frequent sips of liquids. Drinking too much at once may cause more diarrhea. You should also replace minerals, sodium and potassium lost with diarrhea. Pedialyte  and sports drinks can help you replace these minerals. You can also drink clear liquids such as water, weak tea, apple juice, and 7-Up . Avoid acid liquids (orange), caffeine (coffee) or alcohol. Milk products will make the diarrhea worse.     Eat only bland foods. Soda crackers, toast, plain noodles, gelatin, applesauce and bananas are good first choices. Avoid foods that have acid, are spicy, fatty or fibrous (such as meats, coarse grains, vegetables). You may start eating these foods again in about 3 days when you are better.     Sometimes treatment includes prescription medicine to prevent diarrhea. If your doctor prescribes these for you, take them as directed.     Nonprescription medicine is available for the treatment of diarrhea and can be very effective. If you use it, make sure you use the dose recommended on the package. Check with your healthcare provider before you use any medicine for diarrhea.     Don t take ibuprofen, or other nonsteroidal anti-inflammatory medicines  "without checking with your healthcare provider.   Probiotics: If you have been given an antibiotic, you may want to also take a probiotic pill or eat yogurt with live cultures. Probiotics have \"good bacteria\" to help your intestines stay healthy. Studies have shown that probiotics help prevent diarrhea and other intestine problems (including C. diff infection) when you take antibiotics. You can buy these without a prescription in the pharmacy section of the store.   If you were given a prescription for medicine here today, be sure to read all of the information (including the package insert) that comes with your prescription.  This will include important information about the medicine, its side effects, and any warnings that you need to know about.  The pharmacist who fills the prescription can provide more information and answer questions you may have about the medicine.  If you have questions or concerns that the pharmacist cannot address, please call or return to the Emergency Department.   Opioid Medication Information    Pain medications are among the most commonly prescribed medicines, so we are including this information for all our patients. If you did not receive pain medication or get a prescription for pain medicine, you can ignore it.     You may have been given a prescription for an opioid (narcotic) pain medicine and/or have received a pain medicine while here in the Emergency Department. These medicines can make you drowsy or impaired. You must not drive, operate dangerous equipment, or engage in any other dangerous activities while taking these medications. If you drive while taking these medications, you could be arrested for DUI, or driving under the influence. Do not drink any alcohol while you are taking these medications.     Opioid pain medications can cause addiction. If you have a history of chemical dependency of any type, you are at a higher risk of becoming addicted to pain medications.  " Only take these prescribed medications to treat your pain when all other options have been tried. Take it for as short a time and as few doses as possible. Store your pain pills in a secure place, as they are frequently stolen and provide a dangerous opportunity for children or visitors in your house to start abusing these powerful medications. We will not replace any lost or stolen medicine.  As soon as your pain is better, you should flush all your remaining medication.     Many prescription pain medications contain Tylenol  (acetaminophen), including Vicodin , Tylenol #3 , Norco , Lortab , and Percocet .  You should not take any extra pills of Tylenol  if you are using these prescription medications or you can get very sick.  Do not ever take more than 3000 mg of acetaminophen in any 24 hour period.    All opioids tend to cause constipation. Drink plenty of water and eat foods that have a lot of fiber, such as fruits, vegetables, prune juice, apple juice and high fiber cereal.  Take a laxative if you don t move your bowels at least every other day. Miralax , Milk of Magnesia, Colace , or Senna  can be used to keep you regular.      Remember that you can always come back to the Emergency Department if you are not able to see your regular doctor in the amount of time listed above, if you get any new symptoms, or if there is anything that worries you.    Discharge Instructions  Abdominal Pain    Abdominal pain can be caused by many things. Your evaluation today does not show the exact cause for your pain. Your doctor today has decided that it is unlikely your pain is due to a life threatening problem, or a problem requiring surgery or hospital admission. Sometimes those problems cannot be found right away, so it is very important that you follow up as directed.  Sometimes only the changes which occur over time allow the cause of your pain to be found.    Return to the Emergency Department for a recheck in 8-12 hours  if your pain continues.  If your pain gets worse, changes in location, or feels different, return to the Emergency Department right away.    ADULTS:  Return to the Emergency Department right away if:      You get an oral temperature above 102oF or as directed by your doctor.    You have blood in your stools (bright red or black, tarry stools).    You keep throwing up or can t drink liquids.    You see blood when you throw up.    You can t have a bowel movement or you can t pass gas.    Your stomach gets bloated or bigger.    Your skin or the whites of your eyes look yellow.    You faint.    You have bloody, frequent or painful urination.    You have new symptoms or anything that worries you.    CHILDREN:  Return to the Emergency Department right away if your child has any of the above-listed symptoms or the following:      Pushes your hand away or screams/cries when his/her belly is touched.    You notice your child is very fussy or weak.    Your child is very tired and is too tired to eat or drink.    Your child is dehydrated.  Signs of dehydration can be:  o Your infant has had no wet diapers in 4-5 hours.  o Your older child has not passed urine in 6-8 hours.  o Your infant or child starts to have dry mouth and lips, or no saliva or tears.    PREGNANT WOMEN:  Return to the Emergency Department right away if you have any of the above-listed symptoms or the following:      You have bleeding, leaking fluid or passing tissue from the vagina.    You have worse pain or cramping, or pain in your shoulder or back.    You have vomiting that will not stop.    You have painful or bloody urination.    You have a temperature of 100oF or more.    Your baby is not moving as much as usual.    You faint.    You get a bad headache with or without eye problems and abdominal pain.    You have a convulsion or seizure.    You have unusual discharge from your vagina and abdominal pain.    Abdominal pain is pretty common during  "pregnancy.  Your pain may or may not be related to your pregnancy. You should follow-up closely with your OB doctor so they can evaluate you and your baby.  Until you follow-up with your regular doctor, do the following:       Avoid sex and do not put anything in your vagina.    Drink clear fluids.    Only take medications approved by your doctor.    MORE INFORMATION:    Appendicitis:  A possible cause of abdominal pain in any person who still has their appendix is acute appendicitis. Appendicitis is often hard to diagnose.  Testing does not always rule out early appendicitis or other causes of abdominal pain. Close follow-up with your doctor and re-evaluations may be needed to figure out the reason for your abdominal pain.    Follow-up:  It is very important that you make an appointment with your clinic and go to the appointment.  If you do not follow-up with your primary doctor, it may result in missing an important development which could result in permanent injury or disability and/or lasting pain.  If there is any problem keeping your appointment, call your doctor or return to the Emergency Department.    Medications:  Take your medications as directed by your doctor today.  Before using over-the-counter medications, ask your doctor and make sure to take the medications as directed.  If you have any questions about medications, ask your doctor.    Diet:  Resume your normal diet as much as possible, but do not eat fried, fatty or spicy foods while you have pain.  Do not drink alcohol or have caffeine.  Do not smoke tobacco.    Probiotics: If you have been given an antibiotic, you may want to also take a probiotic pill or eat yogurt with live cultures. Probiotics have \"good bacteria\" to help your intestines stay healthy. Studies have shown that probiotics help prevent diarrhea and other intestine problems (including C. diff infection) when you take antibiotics. You can buy these without a prescription in the " pharmacy section of the store.     If you were given a prescription for medicine here today, be sure to read all of the information (including the package insert) that comes with your prescription.  This will include important information about the medicine, its side effects, and any warnings that you need to know about.  The pharmacist who fills the prescription can provide more information and answer questions you may have about the medicine.  If you have questions or concerns that the pharmacist cannot address, please call or return to the Emergency Department.         Opioid Medication Information    Pain medications are among the most commonly prescribed medicines, so we are including this information for all our patients. If you did not receive pain medication or get a prescription for pain medicine, you can ignore it.     You may have been given a prescription for an opioid (narcotic) pain medicine and/or have received a pain medicine while here in the Emergency Department. These medicines can make you drowsy or impaired. You must not drive, operate dangerous equipment, or engage in any other dangerous activities while taking these medications. If you drive while taking these medications, you could be arrested for DUI, or driving under the influence. Do not drink any alcohol while you are taking these medications.     Opioid pain medications can cause addiction. If you have a history of chemical dependency of any type, you are at a higher risk of becoming addicted to pain medications.  Only take these prescribed medications to treat your pain when all other options have been tried. Take it for as short a time and as few doses as possible. Store your pain pills in a secure place, as they are frequently stolen and provide a dangerous opportunity for children or visitors in your house to start abusing these powerful medications. We will not replace any lost or stolen medicine.  As soon as your pain is better,  you should flush all your remaining medication.     Many prescription pain medications contain Tylenol  (acetaminophen), including Vicodin , Tylenol #3 , Norco , Lortab , and Percocet .  You should not take any extra pills of Tylenol  if you are using these prescription medications or you can get very sick.  Do not ever take more than 3000 mg of acetaminophen in any 24 hour period.    All opioids tend to cause constipation. Drink plenty of water and eat foods that have a lot of fiber, such as fruits, vegetables, prune juice, apple juice and high fiber cereal.  Take a laxative if you don t move your bowels at least every other day. Miralax , Milk of Magnesia, Colace , or Senna  can be used to keep you regular.      Remember that you can always come back to the Emergency Department if you are not able to see your regular doctor in the amount of time listed above, if you get any new symptoms, or if there is anything that worries you.

## 2017-01-02 NOTE — ED NOTES
26-year-old female presents to the ER with complaints of abd pain with nausea, vomiting and diarrhea that started this morning.

## 2017-02-02 DIAGNOSIS — Z30.49 ENCOUNTER FOR SURVEILLANCE OF OTHER CONTRACEPTIVE: Primary | ICD-10-CM

## 2017-02-02 NOTE — TELEPHONE ENCOUNTER
Lo/Ovral 0.3-30mg-mcg  Last Written Prescription Date: 6/9/16  Last Fill Quantity: 84,  # refills: 1   Last Office Visit with [facility], P or [facility] prescribing provider: 2/10/16    Tangela Tavarez,   [facility]

## 2017-02-02 NOTE — Clinical Note
[facility]  Obstetrics & Gynecology  [address]  [place] [address]  Tel: [phone]      February 3, 2017    Althea Stewart                                                                                                                                                       [address]  [facility] [address]              Dear Althea,        We recently received a request from your pharmacy requesting a refill of your OCP's..    A review of your chart indicates that you are due for your annual appointment.  Please contact our office at [phone] to schedule an office visit.    You will need this appointment for future refills on your medication. Your medication was refilled for 3 month (s).    Taking care of your health is important to us and ongoing visits with your provider are vital to your care.  We look forward to seeing you in the near future.    Note: If you are scheduled for a cholesterol or any diabetic lab tests, you will need to fast for 12 hours prior to your lab appointment.        Sincerely,    Steffany Davalos MD

## 2017-02-03 NOTE — TELEPHONE ENCOUNTER
Refill request received for OCP's.  Last related office visit: 2/10/16  Refilled x 3 months.  Reminder letter sent.  MEDINA Castaneda RN.

## 2017-03-15 ENCOUNTER — OFFICE VISIT (OUTPATIENT)
Dept: OPTOMETRY | Facility: CLINIC | Age: 27
End: 2017-03-15
Payer: COMMERCIAL

## 2017-03-15 ENCOUNTER — TELEPHONE (OUTPATIENT)
Dept: OPTOMETRY | Facility: CLINIC | Age: 27
End: 2017-03-15

## 2017-03-15 DIAGNOSIS — H52.203 MYOPIA WITH ASTIGMATISM, BILATERAL: Primary | ICD-10-CM

## 2017-03-15 DIAGNOSIS — H52.13 MYOPIA WITH ASTIGMATISM, BILATERAL: Primary | ICD-10-CM

## 2017-03-15 PROCEDURE — 92310 CONTACT LENS FITTING OU: CPT | Performed by: OPTOMETRIST

## 2017-03-15 PROCEDURE — 92004 COMPRE OPH EXAM NEW PT 1/>: CPT | Performed by: OPTOMETRIST

## 2017-03-15 PROCEDURE — 92015 DETERMINE REFRACTIVE STATE: CPT | Performed by: OPTOMETRIST

## 2017-03-15 ASSESSMENT — REFRACTION_CURRENTRX
OS_DIAMETER: 14.0
OD_SPHERE: -3.75
OS_BASECURVE: 8.7
OD_BRAND: ALCON DAILIES AQUA COMFORT PLUS
OD_BASECURVE: 8.7
OS_BRAND: ALCON DAILIES AQUA COMFORT PLUS
OD_DIAMETER: 14.0
OS_SPHERE: -4.00

## 2017-03-15 ASSESSMENT — REFRACTION_MANIFEST
OD_SPHERE: -4.00
OS_CYLINDER: +0.75
OS_SPHERE: -4.75
OS_SPHERE: -4.50
OS_AXIS: 101
METHOD_AUTOREFRACTION: 1
OD_SPHERE: -4.25
OD_CYLINDER: +0.75
OS_CYLINDER: +0.75
OD_AXIS: 080
OD_CYLINDER: +0.75
OD_AXIS: 0754
OS_AXIS: 113

## 2017-03-15 ASSESSMENT — KERATOMETRY
OS_AXISANGLE2_DEGREES: 002
OS_K2POWER_DIOPTERS: 46.62
OS_AXISANGLE_DEGREES: 092
OS_K1POWER_DIOPTERS: 43.87
OD_AXISANGLE_DEGREES: 085
METHOD_AUTO_MANUAL: AUTOMATED
OD_AXISANGLE2_DEGREES: 175
OD_K2POWER_DIOPTERS: 46.50
OD_K1POWER_DIOPTERS: 43.62

## 2017-03-15 ASSESSMENT — SLIT LAMP EXAM - LIDS
COMMENTS: NORMAL
COMMENTS: NORMAL

## 2017-03-15 ASSESSMENT — REFRACTION_WEARINGRX
OS_SPHERE: -4.75
OD_SPHERE: -4.00
OD_CYLINDER: +0.50
OS_AXIS: 093
OD_AXIS: 093
SPECS_TYPE: SVL
OS_CYLINDER: +0.75

## 2017-03-15 ASSESSMENT — VISUAL ACUITY
OS_CC: 20/20
CORRECTION_TYPE: CONTACTS
METHOD: SNELLEN - LINEAR
OD_CC: 20/20
OS_CC: 20/20
OD_CC: 20/20

## 2017-03-15 ASSESSMENT — TONOMETRY
OS_IOP_MMHG: 15
OD_IOP_MMHG: 15
IOP_METHOD: APPLANATION

## 2017-03-15 ASSESSMENT — CUP TO DISC RATIO
OS_RATIO: 0.45
OD_RATIO: 0.35

## 2017-03-15 ASSESSMENT — EXTERNAL EXAM - RIGHT EYE: OD_EXAM: NORMAL

## 2017-03-15 ASSESSMENT — CONF VISUAL FIELD
OS_NORMAL: 1
OD_NORMAL: 1
METHOD: COUNTING FINGERS

## 2017-03-15 ASSESSMENT — EXTERNAL EXAM - LEFT EYE: OS_EXAM: NORMAL

## 2017-03-15 NOTE — MR AVS SNAPSHOT
"              After Visit Summary   3/15/2017    Althea Torres    MRN: [id number]           Patient Information     Date Of Birth          1990        Visit Information        Provider Department      3/15/2017 12:30 PM Carmen Kamara OD [facility]        Today's Diagnoses     Myopia with astigmatism, bilateral    -  1      Care Instructions    Contact lens trials were ordered today, you will be called when they are here to  , return to clinic for follow up or call with progress so   I may finalize your prescription         Follow-ups after your visit        Follow-up notes from your care team     Return in about 1 year (around 3/15/2018).      Who to contact     If you have questions or need follow up information about today's clinic visit or your schedule please contact [facility] directly at [phone].  Normal or non-critical lab and imaging results will be communicated to you by Bandwagonhart, letter or phone within 4 business days after the clinic has received the results. If you do not hear from us within 7 days, please contact the clinic through MyChart or phone. If you have a critical or abnormal lab result, we will notify you by phone as soon as possible.  Submit refill requests through [employer] or call your pharmacy and they will forward the refill request to us. Please allow 3 business days for your refill to be completed.          Additional Information About Your Visit        MyChart Information     [employer] lets you send messages to your doctor, view your test results, renew your prescriptions, schedule appointments and more. To sign up, go to [email or web address] . Click on \"Log in\" on the left side of the screen, which will take you to the Welcome page. Then click on \"Sign up Now\" on the right side of the page.     You will be asked to enter the access code listed below, as well as some personal information. Please follow the directions " to create your username and password.     Your access code is: [email or web address]  Expires: 2017 12:42 AM     Your access code will  in 90 days. If you need help or a new code, please call your [facility] clinic or [phone].        Care EveryWhere ID     This is your Care EveryWhere ID. This could be used by other organizations to access your [facility] medical records  [id number]         Blood Pressure from Last 3 Encounters:   17 107/70   02/10/16 116/76   10/20/15 114/70    Weight from Last 3 Encounters:   17 72.6 kg (160 lb)   02/10/16 73 kg (160 lb 14.4 oz)   10/20/15 74.6 kg (164 lb 8 oz)              We Performed the Following     CONTACT LENS FITTING,BILAT     EYE EXAM (SIMPLE-NONBILLABLE)     REFRACTION        Primary Care Provider Office Phone # Fax #    Shirley Neema Siu [phone]       [facility] [address] [place] [address]        Thank you!     Thank you for choosing [facility]  for your care. Our goal is always to provide you with excellent care. Hearing back from our patients is one way we can continue to improve our services. Please take a few minutes to complete the written survey that you may receive in the mail after your visit with us. Thank you!             Your Updated Medication List - Protect others around you: Learn how to safely use, store and throw away your medicines at www.disposemymeds.org.          This list is accurate as of: 3/15/17  1:22 PM.  Always use your most recent med list.                   Brand Name Dispense Instructions for use    albuterol 90 MCG/ACT inhaler     3 Inhaler    Inhale 1-2 puffs into the lungs every 6 hours as needed for shortness of breath / dyspnea.       glycopyrrolate 1 MG tablet    ROBINUL         norgestrel-ethinyl estradiol 0.3-30 MG-MCG per tablet    LO/OVRAL    84 tablet    Take 1 tablet by mouth daily       PRISTIQ 50 MG 24 hr tablet   Generic drug:  desvenlafaxine  succinate ER      Take 1 tablet by mouth daily.       spironolactone 50 MG tablet    ALDACTONE         SYMBICORT 80-4.5 MCG/ACT Inhaler   Generic drug:  budesonide-formoterol     3 Inhaler    Inhale 2 puffs into the lungs 2 times daily       TAZORAC 0.05 % Crea cream   Generic drug:  tazarotene      Apply topically daily

## 2017-03-15 NOTE — TELEPHONE ENCOUNTER
Update prescription for glasses  Order contact lens trials and call patient,  Yissel Total 1 8.5/14.1 OD: -4.00, OS: -4.25 she can    call if she wants prescription finalized otherwise if NO IMPROVEMENT I will finalize prescription for dailiamparo aqua comfort plus with updated power.      Carmen Kamara OD

## 2017-03-15 NOTE — PROGRESS NOTES
Chief Complaint   Patient presents with     Contact Lens Evaluation     Cl exam, all is good, slight distance change       sometimes dry at the end of the day, wears 14 hours, sometimes rinses with optifree, because her  uses  Allergies, mild seasonal, wears glasses a couple times per week    Previous contact lens wearer? Yes: Daily Lens  Comfort of contact lenses :Good  Satisfied with current lenses: Yes        Last Eye Exam: 2yrs [employer]   Dilated Previously: Yes    What are you currently using to see?  contacts    Distance Vision Acuity: Noticed gradual change in both eyes    Near Vision Acuity: Satisfied with vision while reading  unaided    Eye Comfort: good  Do you use eye drops? : Yes: OTC Rewetting  Occupation or Hobbies: Computer/Reads           Medical, surgical and family histories reviewed and updated 3/15/2017.       OBJECTIVE: See Ophthalmology exam    ASSESSMENT:    ICD-10-CM    1. Myopia with astigmatism, bilateral H52.13 EYE EXAM (SIMPLE-NONBILLABLE)    H52.203 REFRACTION     CONTACT LENS FITTING,BILAT      PLAN:   Update prescription for glasses  Order contact lens trials and call patient,  Yissel Total 1 8.5/14.1 OD: -4.00, OS: -4.25 she can      call if she wants prescription finalized otherwise if NO IMPROVEMENT  I will finalize prescription for dailies aqua comfort plus with updated power.     Carmen Kamara OD

## 2017-03-15 NOTE — PATIENT INSTRUCTIONS
Contact lens trials were ordered today, you will be called when they are here to  , return to clinic for follow up or call with progress so   I may finalize your prescription

## 2017-04-01 NOTE — ED PROVIDER NOTES
History     Chief Complaint:  Abdominal Pain    HPI   Althea Stewart is a 26 year old female with a history of UTI who presents with abdominal pain. The patient states that between 8837-7728 today, she developed the onset of diffuse central abdominal pain. She describes this pain as sharp, shooting, and a constant 8/10 that is exacerbated with movement. Prior to this, the patient recounts one episode of diarrhea as well as a couple of episodes of emesis. She states that she consumed some Pepto-Bismol prior to presentation, without resolution. The patient reports that her most recent period was last week, and denies being pregnant. She also denies fever, dysuria, vaginal bleeding or discharge, previous abdominal surgeries, or any blood in her stools.     Allergies:  NKDA     Medications:    norgestrel-ethinyl estradiol (LO/OVRAL) 0.3-30 MG-MCG per tablet  spironolactone (ALDACTONE) 50 MG tablet  glycopyrrolate (ROBINUL) 1 MG tablet  budesonide-formoterol (SYMBICORT) 80-4.5 MCG/ACT inhaler  desvenlafaxine (PRISTIQ) 50 MG 24 hr tablet  TAZORAC 0.05 % CREA  albuterol 90 MCG/ACT inhaler     Past Medical History:    UTI  Major depressive disorder  Asthma  LSIL     Past Surgical History:    Colposcopy cervix, biopsy cervix, endocervical curettage, combined    Family History:    No past pertinent family history.    Social History:  Negative for tobacco use.  Negative for alcohol use.  patient is accompanied by   Marital Status:  Single [1]     Review of Systems   Constitutional: Negative for fever.   Gastrointestinal: Positive for nausea, vomiting, abdominal pain (central) and diarrhea. Negative for blood in stool.   Genitourinary: Negative for dysuria, vaginal bleeding and vaginal discharge.   All other systems reviewed and are negative.      Physical Exam   First Vitals:  BP: 109/66 mmHg  Pulse: 105  Temp: 97.5  F (36.4  C)  Resp: 18  Weight: 72.576 kg (160 lb)  SpO2: 100 %      Physical  Exam  Constitutional: The patient is oriented to person, place, and time. Alert and cooperative.  HENT:   Right Ear: External ear normal.   Left Ear: External ear normal.   Nose: Nose normal.   Mouth/Throat: Uvula is midline, oropharynx is clear and moist and mucous membranes are normal. No posterior oropharyngeal edema or erythema.   Eyes: Conjunctivae, EOM and lids are normal. Pupils are equal, round, and reactive to light.   Neck: Trachea normal. Normal range of motion. Neck supple.   Cardiovascular: tachycardia present, regular rhythm, normal heart sounds, and intact distal pulses.    Pulmonary/Chest: Effort normal and breath sounds equal bilaterally. No crackles or wheezing.   Abdominal: Soft. Mild tenderness to palpation in the suprapubic region. No rebound and no guarding.   Musculoskeletal: Normal range of motion.  No extremity tenderness or edema.  Neurological: Alert and Oriented. Strength 5/5 in upper and lower extremities bilaterally. Sensation intact to light touch throughout.  Skin: Skin is dry. No rash noted.          Emergency Department Course   Imaging:  Radiographic findings were communicated with the patient who voiced understanding of the findings.    CT Abdomen Pelvis w/ contrast:   No acute process demonstrated within the abdomen and  Pelvis.Final reading per Radiology.      Laboratory:  CBC: WBC: 11.5 (H), HGB: 14.6, PLT: 248  BMP: Glucose 95 (WNL), o/w WNL (Creatinine: 0.80)    UA with micro: Ketones: 5, Protein Albumin: 10, Leukocyte Esterase: Trace, WBC/HPF: 3 (high), RBC/HPF: 5 (high), Bacteria: Many, Squamous Epithelial/HPF: 6 (high), Mucous: Present, o/w negative    HCG Qualitative: Negative    Interventions:  2109 NS 1000 mL IV   Dilaudid 0.5 mg IV   Zofran 4 mg IV    Emergency Department Course:  Nursing notes and vitals reviewed.  I performed an exam of the patient as documented above.    Blood drawn. This was sent to the lab for further testing, results above.    IV inserted.  Medicine administered as documented above.     The patient provided a urine sample here in the emergency department. This was sent for laboratory testing, findings above.     2259 I reevaluated the patient and provided an update in regards to her ED course.      Findings and plan explained to the Patient and . Patient discharged home with instructions regarding supportive care, medications, and reasons to return. The importance of close follow-up was reviewed. The patient was prescribed Zofran, 4 mg every 8 hours as needed.     I personally reviewed the laboratory results with the Patient and  and answered all related questions prior to discharge.       Impression & Plan      Medical Decision Making:  Althea Stewart is a 26 year old otherwise healthy female who presents to the ED for evaluation of suprapubic abdominal pain, nausea, vomiting, and diarrhea. Upon presentation to the ED, the patient is nontoxic appearing. She is mildly tachycardic, though her vital signs are otherwise within normal limits and stable. On exam, she is well appearing. She is alert, oriented, and neuro exam is nonfocal. Aside from mild tachycardia, cardiopulmonary exam is unremarkable. On abdominal exam, the patient does have mild tenderness to palpation to the suprapubic region. There is no rebound or guarding. The rest of her exam is as mentioned above. She was given IV fluids, Dilaudid, and Zofran. Labs were performed, and are as noted above. Notably, the patient does have mild leukocytosis of 11.5. The patient's urinalysis is remarkable for trace leukocyte esterase and WBC of 3. There are also six epithelial cells.  Given that the patient this is not a clean catch specimen and she denies urinary symptoms, I do not feel that this reflects UTI. I do not feel that antibiotics are indicated at this time. A CT of the abdomen was obtained, and demonstrates no evidence of an acute abnormality in the abdomen or pelvis.  These results were discussed with the patient and she notes understanding. Overall, given the patient's history and presentation, I am concerned for a viral GI illness. Given that she is afebrile and denies hematochezia, I have low suspicion for an invasive bacterial etiology for her symptoms, and do not feel that antibiotics are indicated at this time. She was given a PO trial, and tolerated this well without difficulty. Overall, given that the patient is well appearing and with an unremarkable work up, I do feel that the patient can be discharged to home. The patient was instructed to follow up closely with her primary care physician and she notes understanding and is in agreement with this plan. Return instructions were given. She was stable/improved at the time of discharge.     Diagnosis:    ICD-10-CM    1. Non-intractable vomiting with nausea, unspecified vomiting type R11.2    2. Vomiting and diarrhea R11.10     R19.7    3. Lower abdominal pain R10.30        Discharge Medications:  New Prescriptions    ONDANSETRON (ZOFRAN ODT) 4 MG ODT TAB    Take 1 tablet (4 mg) by mouth every 8 hours as needed       I, Adrian Gaona, am serving as a scribe on 1/1/2017 at 9:33 PM to personally document services performed by Daisha Blanco MD based on my observations and the provider's statements to me.     Adrian Gaona  1/1/2017   [facility] EMERGENCY DEPARTMENT        Daisha Blanco MD  01/02/17 2144   Speaking Coherently

## 2017-04-21 ENCOUNTER — VIRTUAL VISIT (OUTPATIENT)
Dept: FAMILY MEDICINE | Facility: OTHER | Age: 27
End: 2017-04-21

## 2017-04-22 NOTE — PROGRESS NOTES
Date:   Clinician: Amalia Howe  Clinician NPI: [id number]  Patient: Althea Clark  Patient : 1990  Patient Address: [address] [place] [address]  Patient Phone: [phone]  Visit Protocol: UTI  Patient Summary:  Althea is a 26 year old ( : 1990 ) female who initiated a Zip for a presumed bladder infection.     Her symptoms began yesterday and consist of urinary frequency, urgency, and dysuria.   Symptom Details   Urinary Frequency: Several times each hour    She denies flank pain, hematuria, foul smelling urine, vaginal discharge, abdominal pain, recent antibiotic use, nausea, loss of appetite, chills, fever, urinary incontinence, hesitation, and vomiting. Althea has never had kidney stones. She has not been hospitalized, been a patient in a nursing home, or had a catheter in the past two weeks. She denies risk factors for sexually transmitted infections.   Althea has had one (1) UTI in the past 12 months. Her most recent bladder infection was not within the last 4 weeks. Her current symptoms are similar to the previous UTI symptoms. She took an antibiotic for her last infection but does not remember which one.   Althea does not get yeast infections when she takes antibiotics.   She states she is not pregnant and denies breastfeeding. She has menstruated in the past month.   She does NOT smoke or use smokeless tobacco.   MEDICATIONS:  Albuterol inhaler/neb (Ventolin, Proventil, Volmask, Ventodisk) and birth control pill   Patient free text response:  Lo-Ogesterol, Glycoperalate  , ALLERGIES:  NKDA   Clinician Response:  Dear Althea,  Based on the information you have provided, you likely have a recurrent, uncomplicated bladder infection, also known as a urinary tract infection (UTI).   To treat your infection, I am prescribing:   Nitrofurantoin (Macrobid). Swallow one (1) tablet twice a day for 5 days. Take the tablet with food. Continue taking the  tablets even if you feel better before all the medication is gone. There is no refill with this prescription.   Some people develop allergies to antibiotics. If you notice a new rash, significant swelling, or difficulty breathing, stop the medication immediately and go into a clinic for physical evaluation.   Unless you are allergic to the following over-the-counter medication, I recommend using phenazopyridine (AZO, Uristat, or store brand) oral tablet to treat your discomfort with urination. Swallow two (2) tablets three times a day for up to 2 days. Take the pills with a full glass of water after a meal. You will notice that this medication adds an orange/red color to your urine, which may stain fabric. Your contact lenses may also stain if handled after touching the tablets. If your skin or the whites of your eyes develop a yellowish color, it may indicate that your kidneys are not correctly removing the medication. Although this is uncommon, stop using the medication and immediately contact your clinic if this happens. This is an over-the-counter medication that does not require a prescription.   To help treat your current UTI and prevent future occurrences, remember to:     Drink 8-10, 8-ounce glasses of water daily.    Urinate after sexual intercourse.    Wipe front to back after using the bathroom.     Some women may develop a yeast infection as a side effect of taking antibiotics. If you notice symptoms of a yeast infection, Zipnosis can help treat that condition as well. Simply log in and complete another Zip, which will cover all of the necessary questions to determine the best treatment for you.   You should visit a clinic for a follow-up visit if your symptoms do not improve in 1-2 days or if you experience another urinary tract infection soon after completing this treatment.  If you become pregnant during this course of treatment, stop taking the medication and contact your primary care clinician.    Diagnosis: Recurrent Bladder Infection  Diagnosis ICD: N39.0  Additional Clinician Notes: Use additional method of contraception when on birth control pills and antibiotics.  Prescription: nitrofurantoin (Macrobid) 100mg oral tablet 10 tablets, 5 days supply. Take one tablet by mouth two times a day for 5 days. Refills: 0, Refill as needed: no, Allow substitutions: yes  Prescription Sent At: April 21 18:47:50, 2017  Pharmacy: [id number] - [phone] - [id number] [address] [id number]

## 2017-05-16 DIAGNOSIS — Z30.49 ENCOUNTER FOR SURVEILLANCE OF OTHER CONTRACEPTIVE: ICD-10-CM

## 2017-05-16 NOTE — TELEPHONE ENCOUNTER
norgestrel-ethinyl estradiol (LO/OVRAL) 0.3-30 MG-MCG per tablet      Last Written Prescription Date: 2/3/2017  Last Fill Quantity: 84,  # refills: 0   Last Office Visit with G, P or [facility] prescribing provider: 5/15/2015                                         Next 5 appointments (look out 90 days)     Jun 02, 2017  8:40 AM CDT   PHYSICAL with Shirley Siu MD   [facility]    [address]  [facility] [id number] [phone]

## 2017-06-02 ENCOUNTER — OFFICE VISIT (OUTPATIENT)
Dept: INTERNAL MEDICINE | Facility: CLINIC | Age: 27
End: 2017-06-02
Payer: COMMERCIAL

## 2017-06-02 VITALS
HEART RATE: 89 BPM | SYSTOLIC BLOOD PRESSURE: 106 MMHG | WEIGHT: 168.3 LBS | OXYGEN SATURATION: 98 % | HEIGHT: 62 IN | DIASTOLIC BLOOD PRESSURE: 76 MMHG | BODY MASS INDEX: 30.97 KG/M2 | TEMPERATURE: 99.3 F

## 2017-06-02 DIAGNOSIS — J45.20 INTERMITTENT ASTHMA, UNCOMPLICATED: ICD-10-CM

## 2017-06-02 DIAGNOSIS — Z71.89 ADVANCED DIRECTIVES, COUNSELING/DISCUSSION: ICD-10-CM

## 2017-06-02 DIAGNOSIS — Z30.49 ENCOUNTER FOR SURVEILLANCE OF OTHER CONTRACEPTIVE: ICD-10-CM

## 2017-06-02 DIAGNOSIS — Z00.00 ROUTINE GENERAL MEDICAL EXAMINATION AT A HEALTH CARE FACILITY: Primary | ICD-10-CM

## 2017-06-02 PROCEDURE — 88175 CYTOPATH C/V AUTO FLUID REDO: CPT | Performed by: INTERNAL MEDICINE

## 2017-06-02 PROCEDURE — 99395 PREV VISIT EST AGE 18-39: CPT | Performed by: INTERNAL MEDICINE

## 2017-06-02 PROCEDURE — 87624 HPV HI-RISK TYP POOLED RSLT: CPT | Performed by: INTERNAL MEDICINE

## 2017-06-02 NOTE — NURSING NOTE
"Chief Complaint   Patient presents with     Physical     Not fasting, pap due, no concern.       Initial /76 (BP Location: Right arm, Patient Position: Chair, Cuff Size: Adult Regular)  Pulse 89  Temp 99.3  F (37.4  C) (Oral)  Ht 5' 2\" (1.575 m)  Wt 168 lb 4.8 oz (76.3 kg)  LMP 05/23/2017 (Exact Date)  SpO2 98%  BMI 30.78 kg/m2 Estimated body mass index is 30.78 kg/(m^2) as calculated from the following:    Height as of this encounter: 5' 2\" (1.575 m).    Weight as of this encounter: 168 lb 4.8 oz (76.3 kg).  Medication Reconciliation: complete   Patricia Byrd MA      "

## 2017-06-02 NOTE — LETTER
My Asthma Action Plan  Name: Althea Torres   YOB: 1990  Date: 6/3/2017   My doctor: Shirley Siu MD   My clinic: [facility]        My Control Medicine: Budesonide + formoterol (Symbicort) -  160/4.5 mcg .  My Rescue Medicine: Albuterol nebulizer solution .   Albuterol (Proair/Ventolin/Proventil) inhaler .    My Asthma Severity: mild persistent  Avoid your asthma triggers: Patient is unaware of triggers               GREEN ZONE     Good Control    I feel good    No cough or wheeze    Can work, sleep and play without asthma symptoms       Take your asthma control medicine every day.     1. If exercise triggers your asthma, take your rescue medication    15 minutes before exercise or sports, and    During exercise if you have asthma symptoms  2. Spacer to use with inhaler: If you have a spacer, make sure to use it with your inhaler             YELLOW ZONE     Getting Worse  I have ANY of these:    I do not feel good    Cough or wheeze    Chest feels tight    Wake up at night   1. Keep taking your Green Zone medications  2. Start taking your rescue medicine:    every 20 minutes for up to 1 hour. Then every 4 hours for 24-48 hours.  3. If you stay in the Yellow Zone for more than 12-24 hours, contact your doctor.  4. If you do not return to the Green Zone in 12-24 hours or you get worse, start taking your oral steroid medicine if prescribed by your provider.           RED ZONE     Medical Alert - Get Help  I have ANY of these:    I feel awful    Medicine is not helping    Breathing getting harder    Trouble walking or talking    Nose opens wide to breathe       1. Take your rescue medicine NOW  2. If your provider has prescribed an oral steroid medicine, start taking it NOW  3. Call your doctor NOW  4. If you are still in the Red Zone after 20 minutes and you have not reached your doctor:    Take your rescue medicine again and    Call 911 or go to the  emergency room right away    See your regular doctor within 2 weeks of an Emergency Room or Urgent Care visit for follow-up treatment.        Electronically signed by: Shirley Siu, Vanessa 3, 2017    Annual Reminders:  Meet with Asthma Educator,  Flu Shot in the Fall, consider Pneumonia Vaccination for patients with asthma (aged 19 and older).    Pharmacy:    [id number] - [place], [place] - [address]  [id number] - [place], [place] - [address] AT [facility] [address]                    Asthma Triggers  How To Control Things That Make Your Asthma Worse    Triggers are things that make your asthma worse.  Look at the list below to help you find your triggers and what you can do about them.  You can help prevent asthma flare-ups by staying away from your triggers.      Trigger                                                          What you can do   Cigarette Smoke  Tobacco smoke can make asthma worse. Do not allow smoking in your home, car or around you.  Be sure no one smokes at a child s day care or school.  If you smoke, ask your health care provider for ways to help you quit.  Ask family members to quit too.  Ask your health care provider for a referral to Quit Plan to help you quit smoking, or call 4-175-304-PLAN.     Colds, Flu, Bronchitis  These are common triggers of asthma. Wash your hands often.  Don t touch your eyes, nose or mouth.  Get a flu shot every year.     Dust Mites  These are tiny bugs that live in cloth or carpet. They are too small to see. Wash sheets and blankets in hot water every week.   Encase pillows and mattress in dust mite proof covers.  Avoid having carpet if you can. If you have carpet, vacuum weekly.   Use a dust mask and HEPA vacuum.   Pollen and Outdoor Mold  Some people are allergic to trees, grass, or weed pollen, or molds. Try to keep your windows closed.  Limit time out doors when pollen count is high.   Ask you health care provider  about taking medicine during allergy season.     Animal Dander  Some people are allergic to skin flakes, urine or saliva from pets with fur or feathers. Keep pets with fur or feathers out of your home.    If you can t keep the pet outdoors, then keep the pet out of your bedroom.  Keep the bedroom door closed.  Keep pets off cloth furniture and away from stuffed toys.     Mice, Rats, and Cockroaches  Some people are allergic to the waste from these pests.   Cover food and garbage.  Clean up spills and food crumbs.  Store grease in the refrigerator.   Keep food out of the bedroom.   Indoor Mold  This can be a trigger if your home has high moisture. Fix leaking faucets, pipes, or other sources of water.   Clean moldy surfaces.  Dehumidify basement if it is damp and smelly.   Smoke, Strong Odors, and Sprays  These can reduce air quality. Stay away from strong odors and sprays, such as perfume, powder, hair spray, paints, smoke incense, paint, cleaning products, candles and new carpet.   Exercise or Sports  Some people with asthma have this trigger. Be active!  Ask your doctor about taking medicine before sports or exercise to prevent symptoms.    Warm up for 5-10 minutes before and after sports or exercise.     Other Triggers of Asthma  Cold air:  Cover your nose and mouth with a scarf.  Sometimes laughing or crying can be a trigger.  Some medicines and food can trigger asthma.

## 2017-06-02 NOTE — PROGRESS NOTES
Dr Rico's note          ASSESSMENT & PLAN:                                                      (Z00.00) Routine general medical examination at a health care facility  (primary encounter diagnosis)  Comment:   Plan: Pap imaged thin layer diagnostic with HPV         (select HPV order below), HPV High Risk Types         DNA Cervical            (Z30.49) Encounter for surveillance of other contraceptive  Comment:   Plan: norgestrel-ethinyl estradiol (LO/OVRAL) 0.3-30         MG-MCG per tablet            (Z71.89) Advanced directives, counseling/discussion  Comment:   Plan:     (J45.20) Intermittent asthma, uncomplicated  Comment: Controlled    Plan: Continue same meds, same doses for now             Chief Complaint:                                                        Annual exam    SUBJECTIVE:                                                    History of present illness     Asthma controlled with Dulera. Uses Albuterol once a week during exercise     She sees dr Pinzon for allergies and asthma     She sees dr Patricia Reyes, psychiatry for depression. Controlled     BCP since age [age], tolerates well       ROS:   General: Negative for fever, chills, major weight changes, fatigue  Skin: Negative for rashes, abnormal spots  Eyes: Negative for blurred or double vision  ENT/mouth: Negative for sinuses discomfort, earache, sore throat  Respiratory: Negative for cough, wheezes, chronic lung disease  Cardiovascular: Negative for rest or exertional chest pain, shortness of breath, palpitations, leg edema,   Gastrointestinal: Negative for vomiting, abdominal pain, heartburn, blood in stool, diarrhea, constipation  Genitourinary: Negative for urinary frequency, blood in urine, history of kidney stones  Female: Negative for abnormal vaginal bleeding, vaginal discharge  Neuro: Negative for headaches, numbness, tingling, weakness in arms or legs, history of seizure, recent syncope  Psychiatry: Negative for depression, anxiety,  suicidal thoughts  Endo: Negative for known thyroid disease, diabetes.  Hemato/Lymph: Negative for nodes, easy bleeding, history of DVT, blood transfusion  Musculoskeletal: Negative for joint swelling, back pain      PMHx: - reviewed  Past Medical History:   Diagnosis Date     Allergic rhinitis, cause unspecified     sees allergist for allergy induced asthma.     ASCUS on Pap smear 3/15/11, 3/25/14    + HPV < 20 yrs of age     ASTHMA UNSPECIFIED 7/27/2004     Frequent UTI      History of colposcopy with cervical biopsy 10/20/15, 2/10/16    SAL 2, SAL I     Intermittent asthma      Intermittent asthma      LSIL (low grade squamous intraepithelial lesion) on Pap smear 5/10/12, 9/19/12, 5/15/15, 10/19/15, 2/10/16    HPV 66 High Risk     Moderate major depression (H)      psychiatrist: dr Patricia Reyes       PSHx: reviewed  Past Surgical History:   Procedure Laterality Date     COLPOSCOPY CERVIX, BIOPSY CERVIX, ENDOCERVICAL CURETTAGE, COMBINED  9/19/2012        Soc Hx: No daily alcohol, no smoking  Social History     Social History     Marital status:      Spouse name: N/A     Number of children: N/A     Years of education: N/A     Occupational History     Not on file.     Social History Main Topics     Smoking status: Never Smoker     Smokeless tobacco: Never Used     Alcohol use No     Drug use: No     Sexual activity: Yes     Partners: Male     Birth control/ protection: Pill     Other Topics Concern     Not on file     Social History Narrative        Fam Hx: reviewed  Family History   Problem Relation Age of Onset     CEREBROVASCULAR DISEASE Maternal Grandmother      Thyroid Disease Maternal Aunt      grave's dz         Screening: reviewed      All: reviewed    Meds: reviewed  Current Outpatient Prescriptions   Medication Sig Dispense Refill     norgestrel-ethinyl estradiol (LO/OVRAL) 0.3-30 MG-MCG per tablet Take 1 tablet by mouth daily 84 tablet 0     spironolactone (ALDACTONE) 50 MG tablet   3      "glycopyrrolate (ROBINUL) 1 MG tablet   11     budesonide-formoterol (SYMBICORT) 80-4.5 MCG/ACT inhaler Inhale 2 puffs into the lungs 2 times daily 3 Inhaler 3     desvenlafaxine (PRISTIQ) 50 MG 24 hr tablet Take 1 tablet by mouth daily.       albuterol 90 MCG/ACT inhaler Inhale 1-2 puffs into the lungs every 6 hours as needed for shortness of breath / dyspnea. 3 Inhaler 3       OBJECTIVE:                                                    Physical Exam :  Blood pressure 106/76, pulse 89, temperature 99.3  F (37.4  C), temperature source Oral, height 5' 2\" (1.575 m), weight 168 lb 4.8 oz (76.3 kg), last menstrual period 05/23/2017, SpO2 98 %, not currently breastfeeding.     NAD, appears comfortable  Skin clear, no rashes  HEENT: PERRLA, EOMI, anicteric sclera, pink conjunctiva, external ears appear normal, bilateral tympanic membranes clinically normal, oropharynx normal color.  Neck: supple, no JVD, no Carotid bruits, no thyroidmegaly  Lymph nodes non palpable in the cervical, supraclavicular axillaries, inguinal areas  Chest: clear to auscultation with good respiratory effort  Cardiac: S1S2, RRR, no mgr appreciated  Abdomen: soft, not tender, not distended, audible bowel sound, no hepatosplenomegaly, no palpable masses, no abdominal bruits  Extremities: no cyanosis, clubbing or edema.   Neuro: A, Ox3, no focal signs.  Breast exam in supine and erect position: they are symmetrical, no skin changes, no tenderness or nodes on palpation. Nipples are erect, no skin lesions, no discharge on pressure.    Pelvic exam: Normal external genitals, normal appearing perineum, normal appearing urethra,  vaginal mucosa pink, no discharge, Cervix appears normal, Pap smear obtained. On bimanual exam, I did not feel any uterus or ovarian masses, and she denies any tenderness.         Shirley Rico MD  Internal Medicine        SUBJECTIVE:     CC: Althea Torres is an 27 year old woman who presents for preventive " "health visit.     Healthy Habits:    Do you get at least three servings of calcium containing foods daily (dairy, green leafy vegetables, etc.)? yes    Amount of exercise or daily activities, outside of work: 3-4 time(s) per week    Problems taking medications regularly No    Medication side effects: No    Have you had an eye exam in the past two years? yes    Do you see a dentist twice per year? Yes    Do you have sleep apnea, excessive snoring or daytime drowsiness?no            Today's PHQ-2 Score:   PHQ-2 ( 1999 [employer]) 6/2/2017 5/15/2015   Q1: Little interest or pleasure in doing things 0 0   Q2: Feeling down, depressed or hopeless 0 0   PHQ-2 Score 0 0   Q1: Little interest or pleasure in doing things Not at all -   Q2: Feeling down, depressed or hopeless Not at all -   PHQ-2 Score 0 -       Abuse: Current or Past(Physical, Sexual or Emotional)- No  Do you feel safe in your environment - Yes    Social History   Substance Use Topics     Smoking status: Never Smoker     Smokeless tobacco: Never Used     Alcohol use No     3 times per week.    Recent Labs   Lab Test  11/18/15   0758   CHOL  162   HDL  53   LDL  90   TRIG  96   NHDL  109       Reviewed orders with patient.  Reviewed health maintenance and updated orders accordingly - No    Mammo Decision Support:  Mammogram not appropriate for this patient based on age.    Pertinent mammograms are reviewed under the imaging tab.  History of abnormal Pap smear:     Reviewed and updated as needed this visit by clinical staff         Reviewed and updated as needed this visit by Provider            COUNSELING:   Reviewed preventive health counseling, as reflected in patient instructions       Regular exercise       Healthy diet/nutrition         reports that she has never smoked. She has never used smokeless tobacco.    Estimated body mass index is 29.26 kg/(m^2) as calculated from the following:    Height as of 2/10/16: 5' 2\" (1.575 m).    Weight as of 1/1/17: 160 lb " (72.6 kg).       Counseling Resources:  ATP IV Guidelines  Pooled Cohorts Equation Calculator  Breast Cancer Risk Calculator  FRAX Risk Assessment  ICSI Preventive Guidelines  Dietary Guidelines for Americans, 2010  USDA's MyPlate  ASA Prophylaxis  Lung CA Screening    Shirley Siu MD  [facility]  Answers for HPI/ROS submitted by the patient on 6/2/2017   Annual Exam:  Getting at least 3 servings of Calcium per day:: Yes  Bi-annual eye exam:: Yes  Dental care twice a year:: Yes  Sleep apnea or symptoms of sleep apnea:: None  Diet:: Gluten-free/reduced  Frequency of exercise:: 2-3 days/week  Taking medications regularly:: Yes  Medication side effects:: Not applicable  Additional concerns today:: No  PHQ-2 Score: 0  Duration of exercise:: 15-30 minutes

## 2017-06-02 NOTE — MR AVS SNAPSHOT
After Visit Summary   6/2/2017    Althea Torres    MRN: [id number]           Patient Information     Date Of Birth          1990        Visit Information        Provider Department      6/2/2017 8:40 AM Shirley Siu MD [facility]        Today's Diagnoses     Routine general medical examination at a health care facility    -  1    Encounter for surveillance of other contraceptive        Advanced directives, counseling/discussion        Intermittent asthma, uncomplicated          Care Instructions      Preventive Health Recommendations  Female Ages 26 - 39  Yearly exam:   See your health care provider every year in order to    Review health changes.     Discuss preventive care.      Review your medicines if you your doctor has prescribed any.    Until age 30: Get a Pap test every three years (more often if you have had an abnormal result).    After age 30: Talk to your doctor about whether you should have a Pap test every 3 years or have a Pap test with HPV screening every 5 years.   You do not need a Pap test if your uterus was removed (hysterectomy) and you have not had cancer.  You should be tested each year for STDs (sexually transmitted diseases), if you're at risk.   Talk to your provider about how often to have your cholesterol checked.  If you are at risk for diabetes, you should have a diabetes test (fasting glucose).  Shots: Get a flu shot each year. Get a tetanus shot every 10 years.   Nutrition:     Eat at least 5 servings of fruits and vegetables each day.    Eat whole-grain bread, whole-wheat pasta and brown rice instead of white grains and rice.    Talk to your provider about Calcium and Vitamin D.     Lifestyle    Exercise at least 150 minutes a week (30 minutes a day, 5 days of the week). This will help you control your weight and prevent disease.    Limit alcohol to one drink per day.    No smoking.     Wear sunscreen to prevent  "skin cancer.    See your dentist every six months for an exam and cleaning.            Follow-ups after your visit        Who to contact     If you have questions or need follow up information about today's clinic visit or your schedule please contact [facility] directly at [phone].  Normal or non-critical lab and imaging results will be communicated to you by MyChart, letter or phone within 4 business days after the clinic has received the results. If you do not hear from us within 7 days, please contact the clinic through [employer] or phone. If you have a critical or abnormal lab result, we will notify you by phone as soon as possible.  Submit refill requests through [employer] or call your pharmacy and they will forward the refill request to us. Please allow 3 business days for your refill to be completed.          Additional Information About Your Visit        MyChart Information     [employer] gives you secure access to your electronic health record. If you see a primary care provider, you can also send messages to your care team and make appointments. If you have questions, please call your primary care clinic.  If you do not have a primary care provider, please call [phone] and they will assist you.        Care EveryWhere ID     This is your Care EveryWhere ID. This could be used by other organizations to access your [facility] medical records  [id number]        Your Vitals Were     Pulse Temperature Height Last Period Pulse Oximetry BMI (Body Mass Index)    89 99.3  F (37.4  C) (Oral) 5' 2\" (1.575 m) 05/23/2017 (Exact Date) 98% 30.78 kg/m2       Blood Pressure from Last 3 Encounters:   06/02/17 106/76   01/01/17 107/70   02/10/16 116/76    Weight from Last 3 Encounters:   06/02/17 168 lb 4.8 oz (76.3 kg)   01/01/17 160 lb (72.6 kg)   02/10/16 160 lb 14.4 oz (73 kg)              We Performed the Following     Asthma Action Plan (AAP)     HPV High Risk Types DNA Cervical     Pap imaged thin " layer diagnostic with HPV (select HPV order below)          Where to get your medicines      These medications were sent to [id number] - [place], [place] - [address] [facility]  [address] [facility] [address]    Hours:  9Am-9Pm (M-F) 9Am-6Pm (S&S) Phone:  [phone]     norgestrel-ethinyl estradiol 0.3-30 MG-MCG per tablet          Primary Care Provider Office Phone # Fax #    Shirley Siu [phone]       [facility] [address]  [facility] [address]        Thank you!     Thank you for choosing [facility]  for your care. Our goal is always to provide you with excellent care. Hearing back from our patients is one way we can continue to improve our services. Please take a few minutes to complete the written survey that you may receive in the mail after your visit with us. Thank you!             Your Updated Medication List - Protect others around you: Learn how to safely use, store and throw away your medicines at www.disposemymeds.org.          This list is accurate as of: 6/2/17 11:59 PM.  Always use your most recent med list.                   Brand Name Dispense Instructions for use    albuterol 90 MCG/ACT inhaler     3 Inhaler    Inhale 1-2 puffs into the lungs every 6 hours as needed for shortness of breath / dyspnea.       glycopyrrolate 1 MG tablet    ROBINUL         norgestrel-ethinyl estradiol 0.3-30 MG-MCG per tablet    LO/OVRAL    84 tablet    Take 1 tablet by mouth daily       PRISTIQ 50 MG 24 hr tablet   Generic drug:  desvenlafaxine succinate      Take 1 tablet by mouth daily.       spironolactone 50 MG tablet    ALDACTONE         SYMBICORT 80-4.5 MCG/ACT Inhaler   Generic drug:  budesonide-formoterol     3 Inhaler    Inhale 2 puffs into the lungs 2 times daily

## 2017-06-03 ASSESSMENT — ASTHMA QUESTIONNAIRES: ACT_TOTALSCORE: 22

## 2017-06-06 LAB
COPATH REPORT: NORMAL
PAP: NORMAL

## 2017-06-07 LAB
FINAL DIAGNOSIS: NORMAL
HPV HR 12 DNA CVX QL NAA+PROBE: NEGATIVE
HPV16 DNA SPEC QL NAA+PROBE: NEGATIVE
HPV18 DNA SPEC QL NAA+PROBE: NEGATIVE
SPECIMEN DESCRIPTION: NORMAL

## 2017-07-28 ENCOUNTER — VIRTUAL VISIT (OUTPATIENT)
Dept: FAMILY MEDICINE | Facility: OTHER | Age: 27
End: 2017-07-28

## 2017-07-28 NOTE — PROGRESS NOTES
"Date:   Clinician: Charity Levin  Clinician NPI: [id number]  Patient: Althea Clark  Patient : 1990  Patient Address: [address] [place] [address]  Patient Phone: [phone]  Visit Protocol: UTI  Patient Summary:  Althea is a 27 year old ( : 1990 ) female who initiated a Visit for a presumed bladder infection. When asked the question \"Please sign me up to receive news, health information and promotions. \", Althea responded \"No\".    Her symptoms began yesterday and consist of urinary frequency, urgency, and dysuria.   Symptom Details   Urinary Frequency: Several times each hour    She denies recent antibiotic use, flank pain, vomiting, hematuria, foul smelling urine, nausea, loss of appetite, chills, fever, urinary incontinence, hesitation, vaginal discharge, and abdominal pain. Althea has never had kidney stones. She has not been hospitalized, been a patient in a nursing home, or had a catheter in the past two weeks. She denies risk factors for sexually transmitted infections.   Althea has had one (1) UTI in the past 12 months. Her most recent bladder infection was not within the last 4 weeks. Her current symptoms are similar to the previous UTI symptoms. She took an antibiotic for her last infection but does not remember which one.   Althea does not get yeast infections when she takes antibiotics.   She states she is not pregnant and denies breastfeeding. She has menstruated in the past month.   She does NOT smoke or use smokeless tobacco.   MEDICATIONS:  Advair inhaler and birth control pill   Patient free text response:  None  , ALLERGIES:  NKDA   Clinician Response:  Dear Althea,  Based on the information you have provided, you likely have a bladder infection, also called an acute urinary tract infection (UTI).   To treat your infection, I am prescribing:   Bactrim DS. Swallow one (1) tablet twice a day for 3 days to treat your bladder infection. " Continue taking the tablets even if you feel better before all the medication is gone. There is no refill with this prescription.   Some people develop allergies to antibiotics. If you notice a new rash, significant swelling, or difficulty breathing, stop the medication immediately and go into a clinic for physical evaluation.   Unless you are allergic to the following over-the-counter medication, I recommend using phenazopyridine (AZO, Uristat, or store brand) oral tablet to treat your discomfort with urination. Swallow two (2) tablets three times a day for up to 2 days. Take the pills with a full glass of water after a meal. You will notice that this medication adds an orange/red color to your urine, which may stain fabric. Your contact lenses may also stain if handled after touching the tablets. If your skin or the whites of your eyes develop a yellowish color, it may indicate that your kidneys are not correctly removing the medication. Although this is uncommon, stop using the medication and immediately contact your clinic if this happens. This is an over-the-counter medication that does not require a prescription.   To help treat your current UTI and prevent future occurrences, remember to:     Drink 8-10, 8-ounce glasses of water daily.    Urinate after sexual intercourse.    Wipe front to back after using the bathroom.     Some women may develop a yeast infection as a side effect of taking antibiotics. If you notice symptoms of a yeast infection, [facility] can help treat that condition as well. Simply log in and complete another Visit, which will cover all of the necessary questions to determine the best treatment for you.   You should visit a clinic for a follow-up visit if your symptoms do not improve in 1-2 days or if you experience another urinary tract infection soon after completing this treatment.  If you become pregnant during this course of treatment, stop taking the medication and contact your primary  care provider.   Diagnosis: Acute Uncomplicated Bladder Infection  Diagnosis ICD: N39.0  Prescription: sulfamethoxazole-TMP DS (Bactrim DS) 800-160mg oral tablet 6 tablets, 3 days supply. Take one tablet by mouth two times a day for 3 days. Refills: 0, Refill as needed: no, Allow substitutions: yes  Pharmacy: [id number] - [phone] - [id number] [place] [address]

## 2017-08-01 ENCOUNTER — OFFICE VISIT (OUTPATIENT)
Dept: PODIATRY | Facility: CLINIC | Age: 27
End: 2017-08-01
Payer: COMMERCIAL

## 2017-08-01 VITALS
HEIGHT: 62 IN | SYSTOLIC BLOOD PRESSURE: 108 MMHG | BODY MASS INDEX: 31.3 KG/M2 | DIASTOLIC BLOOD PRESSURE: 74 MMHG | WEIGHT: 170.1 LBS

## 2017-08-01 DIAGNOSIS — M76.821 POSTERIOR TIBIAL TENDONITIS OF RIGHT LEG: ICD-10-CM

## 2017-08-01 DIAGNOSIS — M79.671 PAIN IN BOTH FEET: Primary | ICD-10-CM

## 2017-08-01 DIAGNOSIS — M79.672 PAIN IN BOTH FEET: Primary | ICD-10-CM

## 2017-08-01 DIAGNOSIS — M72.2 BILATERAL PLANTAR FASCIITIS: ICD-10-CM

## 2017-08-01 DIAGNOSIS — M76.822 LEFT TIBIALIS POSTERIOR TENDINITIS: ICD-10-CM

## 2017-08-01 DIAGNOSIS — Q66.70 PES CAVUS, CONGENITAL: ICD-10-CM

## 2017-08-01 PROCEDURE — 99203 OFFICE O/P NEW LOW 30 MIN: CPT | Performed by: PODIATRIST

## 2017-08-01 RX ORDER — DEXAMETHASONE SODIUM PHOSPHATE 4 MG/ML
4 INJECTION, SOLUTION INTRA-ARTICULAR; INTRALESIONAL; INTRAMUSCULAR; INTRAVENOUS; SOFT TISSUE ONCE
Qty: 30 ML | Refills: 0 | Status: SHIPPED | OUTPATIENT
Start: 2017-08-01 | End: 2017-09-12

## 2017-08-01 NOTE — MR AVS SNAPSHOT
After Visit Summary   8/1/2017    Althea Lopez    MRN: [id number]           Patient Information     Date Of Birth          1990        Visit Information        Provider Department      8/1/2017 9:00 AM Promise Kaufman DPM, Podiatry/Foot and Ankle Surgery [facility]        Today's Diagnoses     Pain in both feet    -  1    Posterior tibial tendonitis of right leg        Left tibialis posterior tendinitis        Pes cavus, congenital        Bilateral plantar fasciitis          Care Instructions      DR. KAUFMAN'S CLINIC LOCATIONS:   MONDAY AM - [place] TUESDAY - [place]   [address] [address] [address] [place] [address] [address] [place] [address]   [phone] / [phone] / [phone]       WEDNESDAY - [place] FRIDAY AM - [facility]   [address]   CHR[place] Shepherd [address] [address] [address] [address]   [phone] / [phone]       FRIDAY PM - [place] SCHEDULE SURGERY: [phone]   [address] BILLING QUESTIONS: [phone]   CHRIS Ramirez [address] AFTER HOURS: [phone]   [phone] / [phone] APPOINTMENTS: [phone]     TENDONITIS   Tendons are the strong fibrous portions ofmuscles that attach to bones and allow the muscle to move a joint when it contracts. Tendons are very strong because they have a lot of force exerted on them. Sometimes tendons can become painful because they have suffered an acute injury, in which too much force was exerted at one time, or an overuse injury, in which a normal force was exerted too frequently or over a prolonged period of time. As a result, there is damage to the tendon and its surrounding soft tissue structures and they become inflammed. Because tendons do not have a great blood supply, they do not heal rapidly and the inflammation can become chronic.   Conservative treatment for tendinitis involves rest and anti-inflammatory  measures. Ice is applied 15 minutes 2-3 times daily. Anti-inflammatory medications called NSAIDs (ibuprofen, example) can be taken provided they are used with caution, as they can lead to internal bleeding and increase the risk ofstroke and heart attack. Sometimes topical nitroglycerin is prescribed to help with pain. Often your doctor will use a special shoe or removable walking cast to immobilize the tendon, allowing it to heal without further damage from use. These devices are very useful in helping tendons heal, but they may slow you down or make you feel like your hip, knee, or back are out ofalignment. This is temporary and should go away once you are out ofthe immobilization. You should not use a walking cast when showering or driving. Another option is Platelet Rich Plasma injections. (Normally done with a Sports and Orthorapedic doctor.   If conservative measures fail, your physician may need to surgically repair the tendon by removing any chronic inflammatory tissue and sewing it back together. Sometimes it is sewn to an adjacent tendon with similar function for support and sometimes it is lengthened. . Sometimes the bones around the tendon need to be realigned or reshaped to better support the tendon or prevent further damage. Your foot and ankle surgeon will discuss the specifics of your surgery with you, should you need it.    Towel stretch: Sit on a hard surface with your injured leg stretched out in front of you. Loop a towel around your toes and the ball of your foot and pull the towel toward your body keeping your leg straight. Hold this position for 15 to 30 seconds and then relax. Repeat 3 times. Then push the towel away with the ball of your foot. Repeat 3 times.  When you don't feel much of a stretch using the towel, you can start the standing calf stretch and the following exercises.  Standing calf stretch: Stand facing a wall with your hands on the wall at about eye level. Keep your injured  leg back with your heel on the floor. Keep the other leg forward with the knee bent. Turn your back foot slightly inward (as if you were pigeon-toed). Slowly lean into the wall until you feel a stretch in the back of your calf. Hold the stretch for 15 to 30 seconds. Return to the starting position. Repeat 3 times. Do this exercise several times each day.   Standing soleus stretch: Stand facing a wall with your hands on the wall at about chest height. Keep your injured leg back with your heel on the floor. Keep the other leg forward with the knee bent. Turn your back foot slightly inward (as if you were pigeon-toed). Bend your back knee slightly and gently lean into the wall until you feel a stretch in the lower calf of your injured leg. Hold the stretch for 15 to 30 seconds. Return to the starting position. Repeat 3 times.   Achilles stretch: Stand with the ball of one foot on a stair. Reach for the step below with your heel until you feel a stretch in the arch of your foot. Hold this position for 15 to 30 seconds and then relax. Repeat 3 times.   Heel raise: Balance yourself while standing behind a chair or counter. Using the chair or counter as a support to help you, raise your body up onto your toes and hold for 5 seconds. Then slowly lower yourself down without holding onto the support. (It's OK to keep holding onto the support if you need to.) When this exercise becomes less painful, try lowering yourself down on the injured leg only. Repeat 15 times. Do 2 sets of 15. Rest 30 seconds between sets.   Step-up: Stand with the foot of your injured leg on a support 3 to 5 inches high (like a small step or block of wood). Keep your other foot flat on the floor. Shift your weight onto the injured leg on the support. Straighten your injured leg as the other leg comes off the floor. Return to the starting position by bending your injured leg and slowly lowering your uninjured leg back to the floor. Do 2 sets of 15.    Resisted ankle eversion: Sit with both legs stretched out in front of you, with your feet about a shoulder's width apart. Tie a loop in one end of elastic tubing. Put the foot of your injured leg through the loop so that the tubing goes around the arch of that foot and wraps around the outside of the other foot. Hold onto the other end of the tubing with your hand to provide tension. Turn the foot of your injured leg up and out. Make sure you keep your other foot still so that it will allow the tubing to stretch as you move the foot of your injured leg. Return to the starting position. Do 2 sets of 15.   Balance and reach exercises: Stand next to a chair with your injured leg farther from the chair. The chair will provide support if you need it. Stand on the foot of your injured leg and bend your knee slightly. Try to raise the arch of this foot while keeping your big toe on the floor. Keep your foot in this position. With the hand that is farther away from the chair, reach forward in front of you by bending at the waist. Avoid bending your knee any more as you do this. Repeat this 10 times. To make the exercise more challenging, reach farther in front of you. Do 2 sets of 10.  the same position as above. While keeping your arch height, reach the hand that is farther away from the chair across your body toward the chair. The farther you reach, the more challenging the exercise. Do 2 sets of 10.     Resisted ankle eversion: Sit with both legs stretched out in front of you, with your feet about a shoulder's width apart. Tie a loop in one end of elastic tubing. Put the foot of your injured leg through the loop so that the tubing goes around the arch of that foot and wraps around the outside of the other foot. Hold onto the other end of the tubing with your hand to provide tension. Turn the foot of your injured leg up and out. Make sure you keep your other foot still so that it will allow the tubing to stretch  as you move the foot of your injured leg. Return to the starting position. Do 2 sets of 15.   If you have access to a wobble board, do the following exercises:  Wobble board exercises:   Stand on a wobble board with your feet shoulder width apart. Rock the board forwards and backwards 30 times, then side to side 30 times. Hold on to a chair if you need support.   Rotate the wobble board around so that the edge of the board is in contact with the floor at all times. Do this 30 times in a clockwise and then a counterclockwise direction.   Balance on the wobble board for as long as you can without letting the edges touch the floor. Try to do this for 2 minutes without touching the floor.   Rotate the wobble board in clockwise and counterclockwise circles, but do not let the edge of the board touch the floor.   When you have mastered exercises A through D, try repeating them while standing on just your injured leg.   After you are able to do these exercises on one leg, try to do them with your eyes closed. Make sure you have something nearby to support you in case you lose your balance.  PLANTAR FASCIITIS  Plantar fasciitis is often referred to as heel spurs or heel pain. Plantar fasciitis is a very common problem that affects people of all foot shapes, age, weight and activity level. Pain may be in the arch or on the weight-bearing surface of the heel. The pain may come on without injury or identifiable cause. Pain is generally present when first getting out of bed in the morning or up from a seated break.     CAUSES  The plantar fascia is a dense fibrous band of tissue that stretches across the bottom surface of the foot. The fascia helps support the foot muscles and arch. Plantar fasciitis is thought to be caused by mechanical strain or overload. Frequent walking without shoes or wearing unsupportive shoes is thought to cause structural overload and ultimately inflammation of the plantar fascia. Some people have heel  spurs that can be seen on x-ray. The heel spur is actually a minor component of plantar fascitis and is largely ignored.       SELF TREATMENT   The easiest solution is to stop walking around your home without shoes. Plantar fasciitis is largely a shoe problem. Shoes are either not being worn often enough or your current shoes are inadequate for your weight, foot structure or activity level. The majority of shoes on the market today are not sufficient to resist development of plantar fasciitis or to promote healing. Assume that your current shoes are inadequate and will need to be replaced. Even high quality shoes wear out with 6 months to one year of frequent use. Weight loss is another option. Losing ten pounds in the next two months may be enough to resolve the problem. Ice applied to the area of pain two to three times per day for ten minutes each session can be very helpful. This should continue until the problem resolves. Achilles tendon stretching is essential. Stretch multiple times daily to promote healing and to prevent recurrence in the future.     MEDICAL TREATMENT  Medical treatments often include custom arch supports, cortisone injections, physical therapy, splints to be worn in bed, prescription medications and surgery. The home treatments listed above will be necessary regardless of these advanced medical treatments. Surgery is rarely needed but is very helpful in selected cases.     PROGNOSIS  Plantar fasciitis can last from one day to a lifetime. Some people get intermittent fascitis that is very short-lived. Others suffer daily for years. Excessive body weight, frequent bare foot walking, long hours on the feet, inadequate shoes, predisposing foot structures and excessive activity such as running are all potential issues that lead to chronic and/or recurring plantar fascitis. Having plantar fasciitis means that you are forever prone to this problem and will require modification of some of the above  factors. Most people seek treatment within one to four months. Healing usually requires a similar one to four month time frame. Healing time is relative to the amount of effort spent treating the problem.   Plantar fasciitis is highly recurrent. Risk factors often continue, including return to bare foot walking, inadequate shoes, excessive body weight, excessive activities, etc. Your life style and foot structure may predispose you to recurrent plantar fasciitis. A daily prevention regimen can be very helpful. Ongoing use of shoe inserts, careful attention to appropriate shoes, daily Achilles stretching, etc. may prevent recurrence. Prompt attention at the earliest warning signs of heel pain can resolve the problem in as short as a few days.     EXERCISES  Stair Exercise: Step on the stairs with the ball of your foot and hold your position for at least 15 seconds, then slowly step down with the heels of your foot. You can do this daily and as often as you want.   Picking the Towel: Sit comfortably and then pick the towel up with your toes. You can use any object other than a towel as long as the material can be soft and you can pick it up with your toes.  Rolling the Bottle: Use a small ball or frozen water bottle and then roll it around with your foot.   Flex the Toes: Sit comfortably and then flex your toes by pointing it towards the floor or towards your body. This will relax and flex your foot and exercise your plantar fascia, the calf, and the Achilles tendon. The inability of the foot to stretch often causes the bunching up of the plantar fascia area leading to the pain.  Calf/Achilles Stretching: Lay on you back and raise one foot, then point your toes towards the floor. See photo below:               Hold each stretch for 10 seconds. Stretch 10 times per set, three sets per day. Morning, afternoon and evening. If your heel pain is very severe in the morning, consider doing the first set of stretches before  you get out of bed.      OVER THE COUNTER INSERT RECOMMENDATIONS  SuperFeet   Sofsole Fit Spenco   Power Step   Walk-Fit Arch Cradles     Most of these can be found at your local [employer], sporting [email or web address] stores, or online.  **A good high quality over the counter insert should cost around $40-$50      [employer] LOCATIONS  [place]  [address] [facility] [address]  [phone]   [place] [address]  [phone] [place]  [address] [facility]  [phone]   [place]  [address]  [phone]   [place]  [address]  [phone] [place]  [address] [place] [address] [place]  [phone]   [place]  [address]  [phone]   [place]  [address] [place] [address]  [phone] [place]  [address]  [phone]             Body Mass Index (BMI)  Many things can cause foot and ankle problems. Foot structure, activity level, foot mechanics and injuries are common causes of pain.  One very important issue that often goes unmentioned, is body weight. Extra weight can cause increased stress on muscles, ligaments, bones and tendons.  Sometimes just a few extra pounds is all it takes to put one over her/his threshold. Without reducing that stress, it can be difficult to alleviate pain. Some people are uncomfortable addressing this issue, but we feel it is important for you to think about it. As Foot &  Ankle specialists, our job is addressing the lower extremity problem and possible causes. Regarding extra body weight, we encourage patients to discuss diet and weight management plans with their primary care doctors. It is this team approach that gives you the best opportunity for pain relief and getting you back on your feet.                  Follow-ups after your visit        Additional Services     [facility] PT, HAND, AND CHIROPRACTIC REFERRAL       **This order will print in the [facility] Scheduling Office**    Physical Therapy, Hand Therapy and Chiropractic Care are available  through:    *[facility]  *[facility]  *[facility]    Call one number to schedule at any of the above locations: [phone].    Your provider has referred you to: Physical Therapy at [facility] or [facility]    Indication/Reason for Referral: posterior tibial tendonitis bilateral  Onset of Illness: years  Therapy Orders: Evaluate and Treat  Special Programs: None  Special Request: Exercise: Home Exercise Program, Posture/Body Mechanics and Stretching/Flexibility  Modalities: As Indicated: , Iontophoresis (Please Order: Dexamethasone Sodium Phosphate - 4mg/ml injectable, 30 cc total volume) and Ultrasound    Puja Luo      Additional Comments for the Therapist or Chiropractor:     Please be aware that coverage of these services is subject to the terms and limitations of your health insurance plan.  Call member services at your health plan with any benefit or coverage questions.      Please bring the following to your appointment:    *Your personal calendar for scheduling future appointments  *Comfortable clothing            ORTHOTICS REFERRAL       Please be aware that coverage of these services is subject to the terms and limitations of your health insurance plan.  Call member services at your health plan with any benefit or coverage questions.      Please bring the following to your appointment:    >>   Any x-rays, CTs or MRIs which have been performed.  Contact the facility where they were done to arrange for  prior to your scheduled appointment.  Any new CT, MRI or other procedures ordered by your specialist must be performed at a [facility] facility or coordinated by your clinic's referral office.    >>   List of current medications   >>   This referral request   >>   Any documents/labs given to you for this referral    ==This Referral PRINTS in the [facility] (ORTHOTICS & PROSTHETICS) Central scheduling office ==     The [facility]  "Central Scheduling staff will contact patient to arrange appointments. Central Scheduling Phone #:  CHRIS Wade  [phone]     Orthotics: Foot Orthotics with lateral heel posting and heel pad                  Who to contact     If you have questions or need follow up information about today's clinic visit or your schedule please contact [facility] directly at [phone].  Normal or non-critical lab and imaging results will be communicated to you by MyChart, letter or phone within 4 business days after the clinic has received the results. If you do not hear from us within 7 days, please contact the clinic through [employer] or phone. If you have a critical or abnormal lab result, we will notify you by phone as soon as possible.  Submit refill requests through [employer] or call your pharmacy and they will forward the refill request to us. Please allow 3 business days for your refill to be completed.          Additional Information About Your Visit        [employer] Information     [employer] gives you secure access to your electronic health record. If you see a primary care provider, you can also send messages to your care team and make appointments. If you have questions, please call your primary care clinic.  If you do not have a primary care provider, please call [phone] and they will assist you.        Care EveryWhere ID     This is your Care EveryWhere ID. This could be used by other organizations to access your [facility] medical records  [id number]        Your Vitals Were     Height BMI (Body Mass Index)                5' 2\" (1.575 m) 31.11 kg/m2           Blood Pressure from Last 3 Encounters:   08/01/17 108/74   06/02/17 106/76   01/01/17 107/70    Weight from Last 3 Encounters:   08/01/17 170 lb 1.6 oz (77.2 kg)   06/02/17 168 lb 4.8 oz (76.3 kg)   01/01/17 160 lb (72.6 kg)              We Performed the Following     [facility] PT, HAND, AND CHIROPRACTIC REFERRAL     ORTHOTICS REFERRAL        "   Today's Medication Changes          These changes are accurate as of: 8/1/17  9:31 AM.  If you have any questions, ask your nurse or doctor.               Start taking these medicines.        Dose/Directions    dexamethasone 4 MG/ML injection   Commonly known as:  DECADRON   Used for:  Pain in both feet, Posterior tibial tendonitis of right leg, Left tibialis posterior tendinitis, Pes cavus, congenital, Bilateral plantar fasciitis   Started by:  Promise Kaufman DPM, Podiatry/Foot and Ankle Surgery        Dose:  4 mg   Apply 1 mL (4 mg) topically once for 1 dose For physical therapy, iontophoresis   Quantity:  30 mL   Refills:  0            Where to get your medicines      These medications were sent to [id number] - [place], [place] - [address]  [address] [facility] [address]    Hours:  9Am-9Pm (M-F) 9Am-6Pm (S&S) Phone:  [phone]     dexamethasone 4 MG/ML injection                Primary Care Provider Office Phone # Fax #    Shirley Siu [phone]       [facility] [address] [facility] [address]        Equal Access to Services     GERMÁN COFFEY AH: Hadii marisol patel hadasho Soomaali, waaxda luqadaha, qaybta kaalmada adeegyada, felicia trevizo. So [facility] [phone].    ATENCIÓN: Si habla español, tiene a scanlon disposición servicios gratuitos de asistencia lingüística. Llame al [phone].    We comply with applicable federal civil rights laws and [place] laws. We do not discriminate on the basis of race, color, national origin, age, disability sex, sexual orientation or gender identity.            Thank you!     Thank you for choosing [facility]  for your care. Our goal is always to provide you with excellent care. Hearing back from our patients is one way we can continue to improve our services. Please take a few minutes to complete the written survey that you may receive in the mail after your  visit with us. Thank you!             Your Updated Medication List - Protect others around you: Learn how to safely use, store and throw away your medicines at www.disposemymeds.org.          This list is accurate as of: 8/1/17  9:31 AM.  Always use your most recent med list.                   Brand Name Dispense Instructions for use Diagnosis    albuterol 90 MCG/ACT inhaler     3 Inhaler    Inhale 1-2 puffs into the lungs every 6 hours as needed for shortness of breath / dyspnea.    Allergic rhinitis, cause unspecified       dexamethasone 4 MG/ML injection    DECADRON    30 mL    Apply 1 mL (4 mg) topically once for 1 dose For physical therapy, iontophoresis    Pain in both feet, Posterior tibial tendonitis of right leg, Left tibialis posterior tendinitis, Pes cavus, congenital, Bilateral plantar fasciitis       glycopyrrolate 1 MG tablet    ROBINUL          norgestrel-ethinyl estradiol 0.3-30 MG-MCG per tablet    LO/OVRAL    84 tablet    Take 1 tablet by mouth daily    Encounter for surveillance of other contraceptive       PRISTIQ 50 MG 24 hr tablet   Generic drug:  desvenlafaxine succinate      Take 1 tablet by mouth daily.        spironolactone 50 MG tablet    ALDACTONE          SYMBICORT 80-4.5 MCG/ACT Inhaler   Generic drug:  budesonide-formoterol     3 Inhaler    Inhale 2 puffs into the lungs 2 times daily    Intermittent asthma

## 2017-08-01 NOTE — PROGRESS NOTES
PATIENT HISTORY:  Althea Lopez is a 27 year old female who presents to clinic for pain to both feet. Mostly to the arches. Has been going on for 7/ years. Has tried over the counter inserts. Was worse when she was a  but since she hasn't done that for a while, feet ar better. Denies injury. Pain currently is 1/10 but can be 7/10 when it is very sore. Describes it as a deep ache or stiffness. Would like to know what is causing it and what can be done for it. Pain more at the end of the day or on feet a lot.     Review of Systems:  Patient denies fever, chills, rash, wound,numbness, weakness, heart burn, blood in stool, chest pain with activity, calf pain when walking, shortness of breath with activity, chronic cough, easy bleeding/bruising, swelling of ankles, excessive thirst, fatigue, depression, anxiety.  Patient admits to limping at times, stiffness.     PAST MEDICAL HISTORY:   Past Medical History:   Diagnosis Date     Allergic rhinitis, cause unspecified     sees allergist for allergy induced asthma.     ASCUS on Pap smear 3/15/11, 3/25/14    + HPV < 20 yrs of age     ASTHMA UNSPECIFIED 7/27/2004     Frequent UTI      History of colposcopy with cervical biopsy 10/20/15, 2/10/16    SAL 2, SAL I     Intermittent asthma      Intermittent asthma      LSIL (low grade squamous intraepithelial lesion) on Pap smear 5/10/12, 9/19/12, 5/15/15, 10/19/15, 2/10/16    HPV 66 High Risk     Moderate major depression (H)      psychiatrist: dr Patricia Reyes        PAST SURGICAL HISTORY:   Past Surgical History:   Procedure Laterality Date     COLPOSCOPY CERVIX, BIOPSY CERVIX, ENDOCERVICAL CURETTAGE, COMBINED  9/19/2012        MEDICATIONS:   Current Outpatient Prescriptions:      dexamethasone (DECADRON) 4 MG/ML injection, Apply 1 mL (4 mg) topically once for 1 dose For physical therapy, iontophoresis, Disp: 30 mL, Rfl: 0     norgestrel-ethinyl estradiol (LO/OVRAL) 0.3-30 MG-MCG per tablet, Take 1 tablet by  "mouth daily, Disp: 84 tablet, Rfl: 3     spironolactone (ALDACTONE) 50 MG tablet, , Disp: , Rfl: 3     glycopyrrolate (ROBINUL) 1 MG tablet, , Disp: , Rfl: 11     budesonide-formoterol (SYMBICORT) 80-4.5 MCG/ACT inhaler, Inhale 2 puffs into the lungs 2 times daily, Disp: 3 Inhaler, Rfl: 3     desvenlafaxine (PRISTIQ) 50 MG 24 hr tablet, Take 1 tablet by mouth daily., Disp: , Rfl:      albuterol 90 MCG/ACT inhaler, Inhale 1-2 puffs into the lungs every 6 hours as needed for shortness of breath / dyspnea., Disp: 3 Inhaler, Rfl: 3     ALLERGIES:    Allergies   Allergen Reactions     No Known Drug Allergies      Seasonal Allergies         SOCIAL HISTORY:   Social History     Social History     Marital status:      Spouse name: N/A     Number of children: N/A     Years of education: N/A     Occupational History     Not on file.     Social History Main Topics     Smoking status: Never Smoker     Smokeless tobacco: Never Used     Alcohol use No     Drug use: No     Sexual activity: Yes     Partners: Male     Birth control/ protection: Pill     Other Topics Concern     Not on file     Social History Narrative        FAMILY HISTORY:   Family History   Problem Relation Age of Onset     CEREBROVASCULAR DISEASE Maternal Grandmother      Thyroid Disease Maternal Aunt      grave's dz        EXAM:Vitals: /74  Ht 5' 2\" (1.575 m)  Wt 170 lb 1.6 oz (77.2 kg)  BMI 31.11 kg/m2  BMI= Body mass index is 31.11 kg/(m^2).    General appearance: Patient is alert and fully cooperative with history & exam.  No sign of distress is noted during the visit.     Psychiatric: Affect is pleasant & appropriate.  Patient appears motivated to improve health.     Respiratory: Breathing is regular & unlabored while sitting.     HEENT: Hearing is intact to spoken word.  Speech is clear.  No gross evidence of visual impairment that would impact ambulation.     Dermatologic: Skin is intact to both lower extremities without significant " lesions, rash or abrasion.  No paronychia or evidence of soft tissue infection is noted.     Vascular: DP & PT pulses are intact & regular bilaterally.  No significant edema or varicosities noted.  CFT and skin temperature is normal to both lower extremities.     Neurologic: Lower extremity sensation is intact to light touch.  No evidence of weakness or contracture in the lower extremities.  No evidence of neuropathy.     Musculoskeletal: Patient is ambulatory without assistive device or brace.  Increase arch height. Pain on palpation of the posterior tibial tendon insertion and with inversion.      ASSESSMENT:    Pain in both feet  Posterior tibial tendonitis of right leg  Left tibialis posterior tendinitis  Pes cavus, congenital  Bilateral plantar fasciitis     PLAN:  Reviewed patient's chart in [facility]. Reviewed and discussed causes of tendonitis.  We discussed treatments such as immobiliation, icing, stretching, heel lifts, orthotics, physical therapy, MRI.      at this time, recommend custom inserts, stretches, icing, anti inflammatories, and PT with ionto.  If pain continues, recommend MRI.     Promise Kaufman DPM, Podiatry/Foot and Ankle Surgery    Weight management plan: Patient was referred to their PCP to discuss a diet and exercise plan.

## 2017-08-01 NOTE — NURSING NOTE
"Chief Complaint   Patient presents with     Foot Problems     pain on the bootom of foot o nthe arch of foot [id number]        Initial /74  Ht 5' 2\" (1.575 m)  Wt 170 lb 1.6 oz (77.2 kg)  BMI 31.11 kg/m2 Estimated body mass index is 31.11 kg/(m^2) as calculated from the following:    Height as of this encounter: 5' 2\" (1.575 m).    Weight as of this encounter: 170 lb 1.6 oz (77.2 kg).  Medication Reconciliation: complete   Fer Chavarria MA      "

## 2017-08-01 NOTE — LETTER
8/1/2017         RE: Althea Lopez  [id number] [facility] [address]        Dear Colleague,    Thank you for referring your patient, Althea Lopez, to the [facility]. Please see a copy of my visit note below.    PATIENT HISTORY:  Althea Lopez is a 27 year old female who presents to clinic for pain to both feet. Mostly to the arches. Has been going on for 7/ years. Has tried over the counter inserts. Was worse when she was a  but since she hasn't done that for a while, feet ar better. Denies injury. Pain currently is 1/10 but can be 7/10 when it is very sore. Describes it as a deep ache or stiffness. Would like to know what is causing it and what can be done for it. Pain more at the end of the day or on feet a lot.     Review of Systems:  Patient denies fever, chills, rash, wound,numbness, weakness, heart burn, blood in stool, chest pain with activity, calf pain when walking, shortness of breath with activity, chronic cough, easy bleeding/bruising, swelling of ankles, excessive thirst, fatigue, depression, anxiety.  Patient admits to limping at times, stiffness.     PAST MEDICAL HISTORY:   Past Medical History:   Diagnosis Date     Allergic rhinitis, cause unspecified     sees allergist for allergy induced asthma.     ASCUS on Pap smear 3/15/11, 3/25/14    + HPV < 20 yrs of age     ASTHMA UNSPECIFIED 7/27/2004     Frequent UTI      History of colposcopy with cervical biopsy 10/20/15, 2/10/16    SAL 2, SAL I     Intermittent asthma      Intermittent asthma      LSIL (low grade squamous intraepithelial lesion) on Pap smear 5/10/12, 9/19/12, 5/15/15, 10/19/15, 2/10/16    HPV 66 High Risk     Moderate major depression (H)      psychiatrist: dr Patircia Reyes        PAST SURGICAL HISTORY:   Past Surgical History:   Procedure Laterality Date     COLPOSCOPY CERVIX, BIOPSY CERVIX, ENDOCERVICAL CURETTAGE, COMBINED  9/19/2012       "  MEDICATIONS:   Current Outpatient Prescriptions:      dexamethasone (DECADRON) 4 MG/ML injection, Apply 1 mL (4 mg) topically once for 1 dose For physical therapy, iontophoresis, Disp: 30 mL, Rfl: 0     norgestrel-ethinyl estradiol (LO/OVRAL) 0.3-30 MG-MCG per tablet, Take 1 tablet by mouth daily, Disp: 84 tablet, Rfl: 3     spironolactone (ALDACTONE) 50 MG tablet, , Disp: , Rfl: 3     glycopyrrolate (ROBINUL) 1 MG tablet, , Disp: , Rfl: 11     budesonide-formoterol (SYMBICORT) 80-4.5 MCG/ACT inhaler, Inhale 2 puffs into the lungs 2 times daily, Disp: 3 Inhaler, Rfl: 3     desvenlafaxine (PRISTIQ) 50 MG 24 hr tablet, Take 1 tablet by mouth daily., Disp: , Rfl:      albuterol 90 MCG/ACT inhaler, Inhale 1-2 puffs into the lungs every 6 hours as needed for shortness of breath / dyspnea., Disp: 3 Inhaler, Rfl: 3     ALLERGIES:    Allergies   Allergen Reactions     No Known Drug Allergies      Seasonal Allergies         SOCIAL HISTORY:   Social History     Social History     Marital status:      Spouse name: N/A     Number of children: N/A     Years of education: N/A     Occupational History     Not on file.     Social History Main Topics     Smoking status: Never Smoker     Smokeless tobacco: Never Used     Alcohol use No     Drug use: No     Sexual activity: Yes     Partners: Male     Birth control/ protection: Pill     Other Topics Concern     Not on file     Social History Narrative        FAMILY HISTORY:   Family History   Problem Relation Age of Onset     CEREBROVASCULAR DISEASE Maternal Grandmother      Thyroid Disease Maternal Aunt      grave's dz        EXAM:Vitals: /74  Ht 5' 2\" (1.575 m)  Wt 170 lb 1.6 oz (77.2 kg)  BMI 31.11 kg/m2  BMI= Body mass index is 31.11 kg/(m^2).    General appearance: Patient is alert and fully cooperative with history & exam.  No sign of distress is noted during the visit.     Psychiatric: Affect is pleasant & appropriate.  Patient appears motivated to improve " health.     Respiratory: Breathing is regular & unlabored while sitting.     HEENT: Hearing is intact to spoken word.  Speech is clear.  No gross evidence of visual impairment that would impact ambulation.     Dermatologic: Skin is intact to both lower extremities without significant lesions, rash or abrasion.  No paronychia or evidence of soft tissue infection is noted.     Vascular: DP & PT pulses are intact & regular bilaterally.  No significant edema or varicosities noted.  CFT and skin temperature is normal to both lower extremities.     Neurologic: Lower extremity sensation is intact to light touch.  No evidence of weakness or contracture in the lower extremities.  No evidence of neuropathy.     Musculoskeletal: Patient is ambulatory without assistive device or brace.  Increase arch height. Pain on palpation of the posterior tibial tendon insertion and with inversion.      ASSESSMENT:    Pain in both feet  Posterior tibial tendonitis of right leg  Left tibialis posterior tendinitis  Pes cavus, congenital  Bilateral plantar fasciitis     PLAN:  Reviewed patient's chart in [facility]. Reviewed and discussed causes of tendonitis.  We discussed treatments such as immobiliation, icing, stretching, heel lifts, orthotics, physical therapy, MRI.      at this time, recommend custom inserts, stretches, icing, anti inflammatories, and PT with ionto.  If pain continues, recommend MRI.     Promise Kaufman DPM, Podiatry/Foot and Ankle Surgery    Weight management plan: Patient was referred to their PCP to discuss a diet and exercise plan.      Again, thank you for allowing me to participate in the care of your patient.        Sincerely,        Promise Kaufman DPM, Podiatry/Foot and Ankle Surgery

## 2017-08-01 NOTE — PATIENT INSTRUCTIONS
DR. PERDOMO'S CLINIC LOCATIONS:   MONDAY AM - [place] TUESDAY - [place]   [address] [address] [address] [address] [place] [address]   [phone] / [phone] / [phone]       WEDNESDAY - [place] FRIDAY AM - [facility]   [address] [address]   [place] [address] CHRIS Sandhu [address]   [phone] / [phone]       FRIDAY PM - [place] SCHEDULE SURGERY: [phone]   [address] BILLING QUESTIONS: [phone]   CHRIS Ramirez [address] AFTER HOURS: [phone]   [phone] / [phone] APPOINTMENTS: [phone]     TENDONITIS   Tendons are the strong fibrous portions ofmuscles that attach to bones and allow the muscle to move a joint when it contracts. Tendons are very strong because they have a lot of force exerted on them. Sometimes tendons can become painful because they have suffered an acute injury, in which too much force was exerted at one time, or an overuse injury, in which a normal force was exerted too frequently or over a prolonged period of time. As a result, there is damage to the tendon and its surrounding soft tissue structures and they become inflammed. Because tendons do not have a great blood supply, they do not heal rapidly and the inflammation can become chronic.   Conservative treatment for tendinitis involves rest and anti-inflammatory measures. Ice is applied 15 minutes 2-3 times daily. Anti-inflammatory medications called NSAIDs (ibuprofen, example) can be taken provided they are used with caution, as they can lead to internal bleeding and increase the risk ofstroke and heart attack. Sometimes topical nitroglycerin is prescribed to help with pain. Often your doctor will use a special shoe or removable walking cast to immobilize the tendon, allowing it to heal without further damage from use. These devices are very useful in helping tendons heal, but they may slow you down or make  you feel like your hip, knee, or back are out ofalignment. This is temporary and should go away once you are out ofthe immobilization. You should not use a walking cast when showering or driving. Another option is Platelet Rich Plasma injections. (Normally done with a Sports and Orthorapedic doctor.   If conservative measures fail, your physician may need to surgically repair the tendon by removing any chronic inflammatory tissue and sewing it back together. Sometimes it is sewn to an adjacent tendon with similar function for support and sometimes it is lengthened. . Sometimes the bones around the tendon need to be realigned or reshaped to better support the tendon or prevent further damage. Your foot and ankle surgeon will discuss the specifics of your surgery with you, should you need it.    Towel stretch: Sit on a hard surface with your injured leg stretched out in front of you. Loop a towel around your toes and the ball of your foot and pull the towel toward your body keeping your leg straight. Hold this position for 15 to 30 seconds and then relax. Repeat 3 times. Then push the towel away with the ball of your foot. Repeat 3 times.  When you don't feel much of a stretch using the towel, you can start the standing calf stretch and the following exercises.  Standing calf stretch: Stand facing a wall with your hands on the wall at about eye level. Keep your injured leg back with your heel on the floor. Keep the other leg forward with the knee bent. Turn your back foot slightly inward (as if you were pigeon-toed). Slowly lean into the wall until you feel a stretch in the back of your calf. Hold the stretch for 15 to 30 seconds. Return to the starting position. Repeat 3 times. Do this exercise several times each day.   Standing soleus stretch: Stand facing a wall with your hands on the wall at about chest height. Keep your injured leg back with your heel on the floor. Keep the other leg forward with the knee bent.  Turn your back foot slightly inward (as if you were pigeon-toed). Bend your back knee slightly and gently lean into the wall until you feel a stretch in the lower calf of your injured leg. Hold the stretch for 15 to 30 seconds. Return to the starting position. Repeat 3 times.   Achilles stretch: Stand with the ball of one foot on a stair. Reach for the step below with your heel until you feel a stretch in the arch of your foot. Hold this position for 15 to 30 seconds and then relax. Repeat 3 times.   Heel raise: Balance yourself while standing behind a chair or counter. Using the chair or counter as a support to help you, raise your body up onto your toes and hold for 5 seconds. Then slowly lower yourself down without holding onto the support. (It's OK to keep holding onto the support if you need to.) When this exercise becomes less painful, try lowering yourself down on the injured leg only. Repeat 15 times. Do 2 sets of 15. Rest 30 seconds between sets.   Step-up: Stand with the foot of your injured leg on a support 3 to 5 inches high (like a small step or block of wood). Keep your other foot flat on the floor. Shift your weight onto the injured leg on the support. Straighten your injured leg as the other leg comes off the floor. Return to the starting position by bending your injured leg and slowly lowering your uninjured leg back to the floor. Do 2 sets of 15.   Resisted ankle eversion: Sit with both legs stretched out in front of you, with your feet about a shoulder's width apart. Tie a loop in one end of elastic tubing. Put the foot of your injured leg through the loop so that the tubing goes around the arch of that foot and wraps around the outside of the other foot. Hold onto the other end of the tubing with your hand to provide tension. Turn the foot of your injured leg up and out. Make sure you keep your other foot still so that it will allow the tubing to stretch as you move the foot of your injured leg.  Return to the starting position. Do 2 sets of 15.   Balance and reach exercises: Stand next to a chair with your injured leg farther from the chair. The chair will provide support if you need it. Stand on the foot of your injured leg and bend your knee slightly. Try to raise the arch of this foot while keeping your big toe on the floor. Keep your foot in this position. With the hand that is farther away from the chair, reach forward in front of you by bending at the waist. Avoid bending your knee any more as you do this. Repeat this 10 times. To make the exercise more challenging, reach farther in front of you. Do 2 sets of 10.  the same position as above. While keeping your arch height, reach the hand that is farther away from the chair across your body toward the chair. The farther you reach, the more challenging the exercise. Do 2 sets of 10.     Resisted ankle eversion: Sit with both legs stretched out in front of you, with your feet about a shoulder's width apart. Tie a loop in one end of elastic tubing. Put the foot of your injured leg through the loop so that the tubing goes around the arch of that foot and wraps around the outside of the other foot. Hold onto the other end of the tubing with your hand to provide tension. Turn the foot of your injured leg up and out. Make sure you keep your other foot still so that it will allow the tubing to stretch as you move the foot of your injured leg. Return to the starting position. Do 2 sets of 15.   If you have access to a wobble board, do the following exercises:  Wobble board exercises:   Stand on a wobble board with your feet shoulder width apart. Rock the board forwards and backwards 30 times, then side to side 30 times. Hold on to a chair if you need support.   Rotate the wobble board around so that the edge of the board is in contact with the floor at all times. Do this 30 times in a clockwise and then a counterclockwise direction.   Balance on the  wobble board for as long as you can without letting the edges touch the floor. Try to do this for 2 minutes without touching the floor.   Rotate the wobble board in clockwise and counterclockwise circles, but do not let the edge of the board touch the floor.   When you have mastered exercises A through D, try repeating them while standing on just your injured leg.   After you are able to do these exercises on one leg, try to do them with your eyes closed. Make sure you have something nearby to support you in case you lose your balance.  PLANTAR FASCIITIS  Plantar fasciitis is often referred to as heel spurs or heel pain. Plantar fasciitis is a very common problem that affects people of all foot shapes, age, weight and activity level. Pain may be in the arch or on the weight-bearing surface of the heel. The pain may come on without injury or identifiable cause. Pain is generally present when first getting out of bed in the morning or up from a seated break.     CAUSES  The plantar fascia is a dense fibrous band of tissue that stretches across the bottom surface of the foot. The fascia helps support the foot muscles and arch. Plantar fasciitis is thought to be caused by mechanical strain or overload. Frequent walking without shoes or wearing unsupportive shoes is thought to cause structural overload and ultimately inflammation of the plantar fascia. Some people have heel spurs that can be seen on x-ray. The heel spur is actually a minor component of plantar fascitis and is largely ignored.       SELF TREATMENT   The easiest solution is to stop walking around your home without shoes. Plantar fasciitis is largely a shoe problem. Shoes are either not being worn often enough or your current shoes are inadequate for your weight, foot structure or activity level. The majority of shoes on the market today are not sufficient to resist development of plantar fasciitis or to promote healing. Assume that your current shoes are  inadequate and will need to be replaced. Even high quality shoes wear out with 6 months to one year of frequent use. Weight loss is another option. Losing ten pounds in the next two months may be enough to resolve the problem. Ice applied to the area of pain two to three times per day for ten minutes each session can be very helpful. This should continue until the problem resolves. Achilles tendon stretching is essential. Stretch multiple times daily to promote healing and to prevent recurrence in the future.     MEDICAL TREATMENT  Medical treatments often include custom arch supports, cortisone injections, physical therapy, splints to be worn in bed, prescription medications and surgery. The home treatments listed above will be necessary regardless of these advanced medical treatments. Surgery is rarely needed but is very helpful in selected cases.     PROGNOSIS  Plantar fasciitis can last from one day to a lifetime. Some people get intermittent fascitis that is very short-lived. Others suffer daily for years. Excessive body weight, frequent bare foot walking, long hours on the feet, inadequate shoes, predisposing foot structures and excessive activity such as running are all potential issues that lead to chronic and/or recurring plantar fascitis. Having plantar fasciitis means that you are forever prone to this problem and will require modification of some of the above factors. Most people seek treatment within one to four months. Healing usually requires a similar one to four month time frame. Healing time is relative to the amount of effort spent treating the problem.   Plantar fasciitis is highly recurrent. Risk factors often continue, including return to bare foot walking, inadequate shoes, excessive body weight, excessive activities, etc. Your life style and foot structure may predispose you to recurrent plantar fasciitis. A daily prevention regimen can be very helpful. Ongoing use of shoe inserts, careful  attention to appropriate shoes, daily Achilles stretching, etc. may prevent recurrence. Prompt attention at the earliest warning signs of heel pain can resolve the problem in as short as a few days.     EXERCISES  Stair Exercise: Step on the stairs with the ball of your foot and hold your position for at least 15 seconds, then slowly step down with the heels of your foot. You can do this daily and as often as you want.   Picking the Towel: Sit comfortably and then pick the towel up with your toes. You can use any object other than a towel as long as the material can be soft and you can pick it up with your toes.  Rolling the Bottle: Use a small ball or frozen water bottle and then roll it around with your foot.   Flex the Toes: Sit comfortably and then flex your toes by pointing it towards the floor or towards your body. This will relax and flex your foot and exercise your plantar fascia, the calf, and the Achilles tendon. The inability of the foot to stretch often causes the bunching up of the plantar fascia area leading to the pain.  Calf/Achilles Stretching: Lay on you back and raise one foot, then point your toes towards the floor. See photo below:               Hold each stretch for 10 seconds. Stretch 10 times per set, three sets per day. Morning, afternoon and evening. If your heel pain is very severe in the morning, consider doing the first set of stretches before you get out of bed.      OVER THE COUNTER INSERT RECOMMENDATIONS  SuperFeet   Sofsole Fit Spenco   Power Step   Walk-Fit Arch Cradles     Most of these can be found at your local [employer], sporting [employer] stores, or online.  **A good high quality over the counter insert should cost around $40-$50      [facility] LOCATIONS  [address]  [phone]   [address]  [phone] [place] [address]  [phone]   [address]  [phone]   [place] [address] [address]   [phone] [place] [address]  [phone]   [place]  [address] [place] [address]  [phone]   [place]  [address] [facility] [address]  [phone] [place]  [address]  [phone]             Body Mass Index (BMI)  Many things can cause foot and ankle problems. Foot structure, activity level, foot mechanics and injuries are common causes of pain.  One very important issue that often goes unmentioned, is body weight. Extra weight can cause increased stress on muscles, ligaments, bones and tendons.  Sometimes just a few extra pounds is all it takes to put one over her/his threshold. Without reducing that stress, it can be difficult to alleviate pain. Some people are uncomfortable addressing this issue, but we feel it is important for you to think about it. As Foot &  Ankle specialists, our job is addressing the lower extremity problem and possible causes. Regarding extra body weight, we encourage patients to discuss diet and weight management plans with their primary care doctors. It is this team approach that gives you the best opportunity for pain relief and getting you back on your feet.

## 2017-09-12 ENCOUNTER — OFFICE VISIT (OUTPATIENT)
Dept: INTERNAL MEDICINE | Facility: CLINIC | Age: 27
End: 2017-09-12
Payer: COMMERCIAL

## 2017-09-12 VITALS
SYSTOLIC BLOOD PRESSURE: 100 MMHG | HEIGHT: 62 IN | WEIGHT: 173.1 LBS | HEART RATE: 80 BPM | BODY MASS INDEX: 31.86 KG/M2 | DIASTOLIC BLOOD PRESSURE: 68 MMHG | TEMPERATURE: 97.4 F | OXYGEN SATURATION: 99 %

## 2017-09-12 DIAGNOSIS — J30.2 CHRONIC SEASONAL ALLERGIC RHINITIS, UNSPECIFIED TRIGGER: ICD-10-CM

## 2017-09-12 DIAGNOSIS — R09.81 SINUS CONGESTION: Primary | ICD-10-CM

## 2017-09-12 PROCEDURE — 99213 OFFICE O/P EST LOW 20 MIN: CPT | Performed by: INTERNAL MEDICINE

## 2017-09-12 NOTE — PATIENT INSTRUCTIONS
Plan:  1. Boil water and breath the warm vapors 2-3 times a day to try to open up the sinuses.   2. Flonase nasal spray  3. Benadryl at bed time

## 2017-09-12 NOTE — NURSING NOTE
"Chief Complaint   Patient presents with     Sinus Problem       Initial /68 (BP Location: Left arm, Patient Position: Chair, Cuff Size: Adult Large)  Pulse 80  Temp 97.4  F (36.3  C) (Oral)  Ht 5' 2\" (1.575 m)  Wt 173 lb 1.6 oz (78.5 kg)  SpO2 99%  Breastfeeding? No  BMI 31.66 kg/m2 Estimated body mass index is 31.66 kg/(m^2) as calculated from the following:    Height as of this encounter: 5' 2\" (1.575 m).    Weight as of this encounter: 173 lb 1.6 oz (78.5 kg).  Medication Reconciliation: complete    "

## 2017-09-12 NOTE — PROGRESS NOTES
"Dr Rico's note      Patient's instructions / PLAN:                                                        Plan:  1. Boil water and breath the warm vapors 2-3 times a day to try to open up the sinuses.   2. Flonase nasal spray  3. Benadryl at bed time   4.      ASSESSMENT & PLAN:                                                      (R09.81) Sinus congestion  (primary encounter diagnosis)  (J30.2) Chronic seasonal allergic rhinitis, unspecified trigger  Comment: most likely sec allergies. And aggravated by changed in pressure during flights  Plan: as above        Chief complaint:                                                      Ears pressure       SUBJECTIVE:   Althea Lopez is a 27 year old female who presents to clinic today for the following health issues:    Sinus verses ear problems x 1 week.  When she was landing with the airplane Friday and SUnday she had sharp pain in both ears. Now some ears fullness. No decreased hearing, no ischarge  Some HA, mild, tension or sinuses pressure. Hasn't slept well for the past week because   She has been using Symbicort, Allegra D    Review of Systems:                                                      ROS: negative for fever, chills, cough, wheezes, chest pain, shortness of breath, vomiting, abdominal pain, leg swelling     A 10-point review of systems was obtained.  Those pertinent are above and in the in the Subjective section.  The rest of the systems are negative.           OBJECTIVE:             Physical exam:  Blood pressure 100/68, pulse 80, temperature 97.4  F (36.3  C), temperature source Oral, height 5' 2\" (1.575 m), weight 173 lb 1.6 oz (78.5 kg), SpO2 99 %, not currently breastfeeding.   NAD, appears comfortable  Skin: no rashes   HEENT: PERRLA, EOMI, pink conjunctiva, anicteric sclerae, bilateral tympanic membranes are clinically normal, oropharynx is normal color  Neck: supple, no JVD. No thyroidmegaly. Lymph nodes nonpalpable cervical " and supraclavicular.  Chest: clear to auscultation bilaterally, good respiratory effort  Heart: S1 S2, RRR, no mgr appreciated  Abdomen: soft, not tender,   Extremities: no edema,  Neurologic: A, Ox3, no focal signs appreciated    PMHx: reviewed  Past Medical History:   Diagnosis Date     Allergic rhinitis, cause unspecified     sees allergist for allergy induced asthma.     ASCUS on Pap smear 3/15/11, 3/25/14    + HPV < 20 yrs of age     ASTHMA UNSPECIFIED 7/27/2004     Frequent UTI      History of colposcopy with cervical biopsy 10/20/15, 2/10/16    SAL 2, SAL I     Intermittent asthma      Intermittent asthma      LSIL (low grade squamous intraepithelial lesion) on Pap smear 5/10/12, 9/19/12, 5/15/15, 10/19/15, 2/10/16    HPV 66 High Risk     Moderate major depression (H)      psychiatrist: dr Patricia Reyes      PSHx: reviewed  Past Surgical History:   Procedure Laterality Date     COLPOSCOPY CERVIX, BIOPSY CERVIX, ENDOCERVICAL CURETTAGE, COMBINED  9/19/2012        Meds: reviewed  Current Outpatient Prescriptions   Medication Sig Dispense Refill     norgestrel-ethinyl estradiol (LO/OVRAL) 0.3-30 MG-MCG per tablet Take 1 tablet by mouth daily 84 tablet 3     spironolactone (ALDACTONE) 50 MG tablet   3     glycopyrrolate (ROBINUL) 1 MG tablet   11     budesonide-formoterol (SYMBICORT) 80-4.5 MCG/ACT inhaler Inhale 2 puffs into the lungs 2 times daily 3 Inhaler 3     desvenlafaxine (PRISTIQ) 50 MG 24 hr tablet Take 1 tablet by mouth daily.       albuterol 90 MCG/ACT inhaler Inhale 1-2 puffs into the lungs every 6 hours as needed for shortness of breath / dyspnea. 3 Inhaler 3       Soc Hx: reviewed  Fam Hx: reviewed          Shirley Rico MD  Internal Medicine

## 2017-09-12 NOTE — MR AVS SNAPSHOT
"              After Visit Summary   9/12/2017    Althea Lopez    MRN: [id number]           Patient Information     Date Of Birth          1990        Visit Information        Provider Department      9/12/2017 1:00 PM Shirley Siu MD [facility]        Care Instructions    Plan:  1. Boil water and breath the warm vapors 2-3 times a day to try to open up the sinuses.   2. Flonase nasal spray  3. Benadryl at bed time           Follow-ups after your visit        Who to contact     If you have questions or need follow up information about today's clinic visit or your schedule please contact [facility] directly at [phone].  Normal or non-critical lab and imaging results will be communicated to you by [employer], letter or phone within 4 business days after the clinic has received the results. If you do not hear from us within 7 days, please contact the clinic through [employer] or phone. If you have a critical or abnormal lab result, we will notify you by phone as soon as possible.  Submit refill requests through [employer] or call your pharmacy and they will forward the refill request to us. Please allow 3 business days for your refill to be completed.          Additional Information About Your Visit        MyChart Information     [employer] gives you secure access to your electronic health record. If you see a primary care provider, you can also send messages to your care team and make appointments. If you have questions, please call your primary care clinic.  If you do not have a primary care provider, please call [phone] and they will assist you.        Care EveryWhere ID     This is your Care EveryWhere ID. This could be used by other organizations to access your [place] medical records  [id number]        Your Vitals Were     Pulse Temperature Height Pulse Oximetry Breastfeeding? BMI (Body Mass Index)    80 97.4  F (36.3  C) (Oral) 5' 2\" " (1.575 m) 99% No 31.66 kg/m2       Blood Pressure from Last 3 Encounters:   09/12/17 100/68   08/01/17 108/74   06/02/17 106/76    Weight from Last 3 Encounters:   09/12/17 173 lb 1.6 oz (78.5 kg)   08/01/17 170 lb 1.6 oz (77.2 kg)   06/02/17 168 lb 4.8 oz (76.3 kg)              Today, you had the following     No orders found for display       Primary Care Provider Office Phone # Fax #    Shirley Neema Siu [phone]       [address] [facility] [address]        Equal Access to Services     GERMÁN COFFEY : Hadii marisol heltono Solars, waaxda luqadaha, qaybta kaalmada adepatriciayada, felicia hutton . So [facility] [phone].    ATENCIÓN: Si habla español, tiene a scanlon disposición servicios gratuitos de asistencia lingüística. [facility] [phone].    We comply with applicable federal civil rights laws and [place] laws. We do not discriminate on the basis of race, color, national origin, age, disability sex, sexual orientation or gender identity.            Thank you!     Thank you for choosing [facility]  for your care. Our goal is always to provide you with excellent care. Hearing back from our patients is one way we can continue to improve our services. Please take a few minutes to complete the written survey that you may receive in the mail after your visit with us. Thank you!             Your Updated Medication List - Protect others around you: Learn how to safely use, store and throw away your medicines at www.disposemymeds.org.          This list is accurate as of: 9/12/17  1:40 PM.  Always use your most recent med list.                   Brand Name Dispense Instructions for use Diagnosis    albuterol 90 MCG/ACT inhaler     3 Inhaler    Inhale 1-2 puffs into the lungs every 6 hours as needed for shortness of breath / dyspnea.    Allergic rhinitis, cause unspecified       glycopyrrolate 1 MG tablet    ROBINUL          norgestrel-ethinyl  estradiol 0.3-30 MG-MCG per tablet    LO/OVRAL    84 tablet    Take 1 tablet by mouth daily    Encounter for surveillance of other contraceptive       PRISTIQ 50 MG 24 hr tablet   Generic drug:  desvenlafaxine succinate      Take 1 tablet by mouth daily.        spironolactone 50 MG tablet    ALDACTONE          SYMBICORT 80-4.5 MCG/ACT Inhaler   Generic drug:  budesonide-formoterol     3 Inhaler    Inhale 2 puffs into the lungs 2 times daily    Intermittent asthma

## 2018-02-09 ENCOUNTER — TRANSFERRED RECORDS (OUTPATIENT)
Dept: HEALTH INFORMATION MANAGEMENT | Facility: CLINIC | Age: 28
End: 2018-02-09

## 2018-07-07 ENCOUNTER — HEALTH MAINTENANCE LETTER (OUTPATIENT)
Age: 28
End: 2018-07-07

## 2018-07-14 DIAGNOSIS — Z30.49 ENCOUNTER FOR SURVEILLANCE OF OTHER CONTRACEPTIVE: ICD-10-CM

## 2018-07-16 NOTE — TELEPHONE ENCOUNTER
"Requested Prescriptions   Pending Prescriptions Disp Refills     LOW-OGESTREL 0.3-30 MG-MCG per tablet [Pharmacy Med Name: Low-Ogestrel Oral Tablet 0.3-30 MG-MCG] 84 tablet 2    Last Written Prescription Date:  06/02/2017  Last Fill Quantity: 84,  # refills: 3   Last office visit: 9/12/2017 with prescribing provider:     Future Office Visit:   Sig: TAKE ONE TABLET BY MOUTH ONE TIME DAILY    Contraceptives Protocol Passed    7/14/2018  7:01 AM       Passed - Patient is not a current smoker if age is 35 or older       Passed - Recent (12 mo) or future (30 days) visit within the authorizing provider's specialty    Patient had office visit in the last 12 months or has a visit in the next 30 days with authorizing provider or within the authorizing provider's specialty.  See \"Patient Info\" tab in inbasket, or \"Choose Columns\" in Meds & Orders section of the refill encounter.           Passed - No active pregnancy on record       Passed - No positive pregnancy test in past 12 months        "

## 2018-07-18 RX ORDER — NORGESTREL AND ETHINYL ESTRADIOL 0.3-0.03MG
KIT ORAL
Qty: 84 TABLET | Refills: 0 | Status: SHIPPED | OUTPATIENT
Start: 2018-07-18 | End: 2018-08-13

## 2018-08-03 ENCOUNTER — OFFICE VISIT (OUTPATIENT)
Dept: OPTOMETRY | Facility: CLINIC | Age: 28
End: 2018-08-03
Payer: COMMERCIAL

## 2018-08-03 DIAGNOSIS — H52.203 MYOPIA OF BOTH EYES WITH ASTIGMATISM: Primary | ICD-10-CM

## 2018-08-03 DIAGNOSIS — H52.13 MYOPIA OF BOTH EYES WITH ASTIGMATISM: Primary | ICD-10-CM

## 2018-08-03 PROCEDURE — 92015 DETERMINE REFRACTIVE STATE: CPT | Performed by: OPTOMETRIST

## 2018-08-03 PROCEDURE — 92310 CONTACT LENS FITTING OU: CPT | Mod: GA | Performed by: OPTOMETRIST

## 2018-08-03 PROCEDURE — 92014 COMPRE OPH EXAM EST PT 1/>: CPT | Performed by: OPTOMETRIST

## 2018-08-03 ASSESSMENT — REFRACTION_CURRENTRX
OD_BRAND: ALCON DAILIES AQUA COMFORT PLUS BC 8.7, D 14.0
OS_BASECURVE: 8.7
OS_DIAMETER: 14.0
OS_BRAND: ALCON DAILIES AQUA COMFORT PLUS
OS_SPHERE: -4.50
OD_SPHERE: -4.00
OD_DIAMETER: 14.0
OD_BASECURVE: 8.7

## 2018-08-03 ASSESSMENT — KERATOMETRY
OD_K1POWER_DIOPTERS: 43.50
OS_K1POWER_DIOPTERS: 43.87
OS_AXISANGLE_DEGREES: 102
OS_AXISANGLE2_DEGREES: 12
METHOD_AUTO_MANUAL: AUTOMATED
OS_K2POWER_DIOPTERS: 45.00
OD_AXISANGLE_DEGREES: 84
OD_AXISANGLE2_DEGREES: 174
OD_K2POWER_DIOPTERS: 45.75

## 2018-08-03 ASSESSMENT — REFRACTION_MANIFEST
OD_SPHERE: -4.00
OS_SPHERE: -5.25
OD_CYLINDER: +0.50
OD_AXIS: 072
OS_SPHERE: -5.00
OD_AXIS: 066
OS_CYLINDER: +1.00
OS_CYLINDER: +1.00
OS_AXIS: 107
METHOD_AUTOREFRACTION: 1
OD_CYLINDER: +0.50
OD_SPHERE: -4.25
OS_AXIS: 108

## 2018-08-03 ASSESSMENT — VISUAL ACUITY
OS_CC: 20/20
CORRECTION_TYPE: GLASSES
OD_CC: 20/20
OS_CC+: -1
OD_CC+: -1
OS_SC: 20/200
METHOD: SNELLEN - LINEAR
OS_CC: 20/20
OD_CC: 20/20
OD_SC: 20/200

## 2018-08-03 ASSESSMENT — REFRACTION_WEARINGRX
OD_SPHERE: -4.00
OD_AXIS: 093
SPECS_TYPE: SVL
OD_CYLINDER: +0.50
OS_AXIS: 093
OS_SPHERE: -4.75
OS_CYLINDER: +0.75

## 2018-08-03 ASSESSMENT — EXTERNAL EXAM - LEFT EYE: OS_EXAM: NORMAL

## 2018-08-03 ASSESSMENT — CONF VISUAL FIELD
OD_NORMAL: 1
OS_NORMAL: 1

## 2018-08-03 ASSESSMENT — TONOMETRY
OS_IOP_MMHG: 15
OD_IOP_MMHG: 16
IOP_METHOD: APPLANATION

## 2018-08-03 ASSESSMENT — CUP TO DISC RATIO
OD_RATIO: 0.3
OS_RATIO: 0.4

## 2018-08-03 ASSESSMENT — SLIT LAMP EXAM - LIDS
COMMENTS: NORMAL
COMMENTS: NORMAL

## 2018-08-03 ASSESSMENT — EXTERNAL EXAM - RIGHT EYE: OD_EXAM: NORMAL

## 2018-08-03 NOTE — MR AVS SNAPSHOT
After Visit Summary   8/3/2018    Althea Lopez    MRN: [id number]           Patient Information     Date Of Birth          1990        Visit Information        Provider Department      8/3/2018 8:00 AM Carmen Kamara OD [facility]        Today's Diagnoses     Myopia of both eyes with astigmatism    -  1      Care Instructions    Once your contact lens prescription is finalized and you are not having any problems with the trials or current lenses you can order your supply of lenses.   You can order your contact lenses online at [email or web address].  Click on services at the top of the page, then eye care and you will see the link to order contact lenses.  You can also order contact lenses at [phone]. There is no shipping fee if you order an annual supply otherwise  be sure to let them know which office you would like to  the lenses, Rachelle [phone].   blink and refresh contacts optifree          Follow-ups after your visit        Follow-up notes from your care team     Return in about 1 year (around 8/3/2019).      Your next 10 appointments already scheduled     Aug 13, 2018  8:20 AM CDT   [facility]YS[facility] with [facility] Siu MD   [facility]    [address]  [facility] [address] [phone]              Who to contact     If you have questions or need follow up information about today's clinic visit or your schedule please contact [facility] directly at [phone].  Normal or non-critical lab and imaging results will be communicated to you by MyChart, letter or phone within 4 business days after the clinic has received the results. If you do not hear from us within 7 days, please contact the clinic through MyChart or phone. If you have a critical or abnormal lab result, we will notify you by phone as soon as possible.  Submit refill requests through [employer] or  call your pharmacy and they will forward the refill request to us. Please allow 3 business days for your refill to be completed.          Additional Information About Your Visit        MyChart Information     [employer] gives you secure access to your electronic health record. If you see a primary care provider, you can also send messages to your care team and make appointments. If you have questions, please call your primary care clinic.  If you do not have a primary care provider, please call [phone] and they will assist you.        Care EveryWhere ID     This is your Care EveryWhere ID. This could be used by other organizations to access your [facility] records  [id number]         Blood Pressure from Last 3 Encounters:   09/12/17 100/68   08/01/17 108/74   06/02/17 106/76    Weight from Last 3 Encounters:   09/12/17 78.5 kg (173 lb 1.6 oz)   08/01/17 77.2 kg (170 lb 1.6 oz)   06/02/17 76.3 kg (168 lb 4.8 oz)              We Performed the Following     CONTACT LENS FITTING,BILAT     EYE EXAM (SIMPLE-NONBILLABLE)     REFRACTION        Primary Care Provider Office Phone # Fax #    Shirley Neema Siu [phone]       [address]  [facility] [address]        Equal Access to Services     GERMÁN COFFEY : Hadii aad ku hadasho Soomaali, waaxda luqadaha, qaybta kaalmada adeegyada, felicia staples haycharyn ashwin trevizo. So [facility] [phone].    ATENCIÓN: Si habla español, tiene a scanlon disposición servicios gratuitos de asistencia lingüística. Llame al [phone].    We comply with applicable federal civil rights laws and [place] laws. We do not discriminate on the basis of race, color, national origin, age, disability, sex, sexual orientation, or gender identity.            Thank you!     Thank you for choosing [facility]  for your care. Our goal is always to provide you with excellent care. Hearing back from our patients is one way we can continue to improve  our services. Please take a few minutes to complete the written survey that you may receive in the mail after your visit with us. Thank you!             Your Updated Medication List - Protect others around you: Learn how to safely use, store and throw away your medicines at www.disposemymeds.org.          This list is accurate as of 8/3/18  9:00 AM.  Always use your most recent med list.                   Brand Name Dispense Instructions for use Diagnosis    albuterol 90 MCG/ACT inhaler     3 Inhaler    Inhale 1-2 puffs into the lungs every 6 hours as needed for shortness of breath / dyspnea.    Allergic rhinitis, cause unspecified       glycopyrrolate 1 MG tablet    ROBINUL          LOW-OGESTREL 0.3-30 MG-MCG per tablet   Generic drug:  norgestrel-ethinyl estradiol     84 tablet    TAKE ONE TABLET BY MOUTH ONE TIME DAILY    Encounter for surveillance of other contraceptive       PRISTIQ 50 MG 24 hr tablet   Generic drug:  desvenlafaxine succinate      Take 1 tablet by mouth daily.        spironolactone 50 MG tablet    ALDACTONE          SYMBICORT 80-4.5 MCG/ACT Inhaler   Generic drug:  budesonide-formoterol     3 Inhaler    Inhale 2 puffs into the lungs 2 times daily    Intermittent asthma

## 2018-08-03 NOTE — PATIENT INSTRUCTIONS
Once your contact lens prescription is finalized and you are not having any problems with the trials or current lenses you can order your supply of lenses.   You can order your contact lenses online at [email or web address].  Click on services at the top of the page, then eye care and you will see the link to order contact lenses.  You can also order contact lenses at [phone]. There is no shipping fee if you order an annual supply otherwise  be sure to let them know which office you would like to  the lenses, Rachelle [phone].   blink and refresh contacts optifree

## 2018-08-03 NOTE — PROGRESS NOTES
Chief Complaint   Patient presents with     COMPREHENSIVE EYE EXAM     Contact Lens Evaluation     Good comfort , likes current contacts, has not worn for a few days  Rhinitis without ocular symptoms of seasonal allergies   Previous contact lens wearer? Yes: Yissel  Comfort of contact lenses :Good  Satisfied with current lenses: Yes        Last Eye Exam: 1yr  Dilated Previously: Yes    What are you currently using to see?  glasses    Distance Vision Acuity: Satisfied with vision    Near Vision Acuity: Satisfied with vision while reading  unaided    Eye Comfort: good  Do you use eye drops? : No  Occupation or Hobbies:    in construction          Medical, surgical and family histories reviewed and updated 8/3/2018.       OBJECTIVE: See Ophthalmology exam    ASSESSMENT:    ICD-10-CM    1. 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

## 2018-08-03 NOTE — LETTER
8/3/2018         RE: Althea Lopez  [address] [facility] [address]        Dear Colleague,    [facility] Please see a copy of my visit note below.    Chief Complaint   Patient presents with     COMPREHENSIVE EYE EXAM     Contact Lens Evaluation     Good comfort , likes current contacts, has not worn for a few days  Rhinitis without ocular symptoms of seasonal allergies   Previous contact lens wearer? Yes: Yissel  Comfort of contact lenses :Good  Satisfied with current lenses: Yes        Last Eye Exam: 1yr  Dilated Previously: Yes    What are you currently using to see?  glasses    Distance Vision Acuity: Satisfied with vision    Near Vision Acuity: Satisfied with vision while reading  unaided    Eye Comfort: good  Do you use eye drops? : No  Occupation or Hobbies:    in construction          Medical, surgical and family histories reviewed and updated 8/3/2018.       OBJECTIVE: See Ophthalmology exam    ASSESSMENT:    ICD-10-CM    1. 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        Sincerely,        Carmen Kamara, OD

## 2018-08-13 ENCOUNTER — OFFICE VISIT (OUTPATIENT)
Dept: INTERNAL MEDICINE | Facility: CLINIC | Age: 28
End: 2018-08-13
Payer: COMMERCIAL

## 2018-08-13 VITALS
WEIGHT: 173.1 LBS | OXYGEN SATURATION: 98 % | HEIGHT: 62 IN | HEART RATE: 89 BPM | TEMPERATURE: 98.1 F | SYSTOLIC BLOOD PRESSURE: 104 MMHG | RESPIRATION RATE: 16 BRPM | DIASTOLIC BLOOD PRESSURE: 72 MMHG | BODY MASS INDEX: 31.86 KG/M2

## 2018-08-13 DIAGNOSIS — R68.89 HEAT INTOLERANCE: ICD-10-CM

## 2018-08-13 DIAGNOSIS — Z00.00 ROUTINE GENERAL MEDICAL EXAMINATION AT A HEALTH CARE FACILITY: Primary | ICD-10-CM

## 2018-08-13 DIAGNOSIS — Z30.49 ENCOUNTER FOR SURVEILLANCE OF OTHER CONTRACEPTIVE: ICD-10-CM

## 2018-08-13 DIAGNOSIS — R87.612 PAPANICOLAOU SMEAR OF CERVIX WITH LOW GRADE SQUAMOUS INTRAEPITHELIAL LESION (LGSIL): ICD-10-CM

## 2018-08-13 PROCEDURE — 87624 HPV HI-RISK TYP POOLED RSLT: CPT | Performed by: INTERNAL MEDICINE

## 2018-08-13 PROCEDURE — 88175 CYTOPATH C/V AUTO FLUID REDO: CPT | Performed by: INTERNAL MEDICINE

## 2018-08-13 PROCEDURE — 99395 PREV VISIT EST AGE 18-39: CPT | Performed by: INTERNAL MEDICINE

## 2018-08-13 PROCEDURE — 88141 CYTOPATH C/V INTERPRET: CPT | Performed by: INTERNAL MEDICINE

## 2018-08-13 NOTE — PROGRESS NOTES
Dr Rico's note    Patient's instructions / PLAN:                                                        Plan:  1. Labs   2. Birth control pills - renewed       ASSESSMENT & PLAN:                                                      (Z00.00) Routine general medical examination at a health care facility  (primary encounter diagnosis)  Comment:   Plan: Pap imaged thin layer diagnostic with HPV         (select HPV order below), HPV High Risk Types         DNA Cervical, TSH with free T4 reflex, Lipid         panel reflex to direct LDL Fasting,         Comprehensive metabolic panel, CBC with         platelets,     (R68.89) Heat intolerance  Comment:   Plan: TSH with free T4 reflex, Lipid panel reflex to         direct LDL Fasting, Comprehensive metabolic         panel, CBC with platelets,  (Z30.49) Encounter for surveillance of other contraceptive  Comment:   Plan: norgestrel-ethinyl estradiol (LOW-OGESTREL)         0.3-30 MG-MCG per tablet               Chief Complaint:                                                        Annual exam    SUBJECTIVE:                                                    History of present illness     Heat intol  Asthma - stable - dr Pinzon  Psychiatry Dr Patricia Cano   abnormal pap 2016, normal 2017.       ROS:   General: Negative for fever, chills, major weight changes, fatigue  Skin: Negative for rashes, abnormal spots  Eyes: Negative for blurred or double vision  ENT/mouth: Negative for sinuses discomfort, earache, sore throat  Respiratory: Negative for cough, wheezes, chronic lung disease  Cardiovascular: Negative for rest or exertional chest pain, shortness of breath, palpitations, leg edema,   Gastrointestinal: Negative for vomiting, abdominal pain, heartburn, blood in stool, diarrhea, constipation  Genitourinary: Negative for urinary frequency, blood in urine, history of kidney stones  Female: Negative for abnormal vaginal bleeding, vaginal discharge  Neuro: Negative for  headaches, numbness, tingling, weakness in arms or legs, history of seizure, recent syncope  Psychiatry: Negative for depression, anxiety, suicidal thoughts  Endo: Negative for known thyroid disease, diabetes.  Hemato/Lymph: Negative for nodes, easy bleeding, history of DVT, blood transfusion  Musculoskeletal: Negative for joint swelling, back pain      PMHx: - reviewed  Past Medical History:   Diagnosis Date     Allergic rhinitis, cause unspecified     sees allergist for allergy induced asthma.     ASCUS on Pap smear 3/15/11, 3/25/14    + HPV < 20 yrs of age     ASTHMA UNSPECIFIED 7/27/2004     Frequent UTI      History of colposcopy with cervical biopsy 10/20/15, 2/10/16    SAL 2, SAL I     Intermittent asthma      Intermittent asthma      LSIL (low grade squamous intraepithelial lesion) on Pap smear 5/10/12, 9/19/12, 5/15/15, 10/19/15, 2/10/16    HPV 66 High Risk     Moderate major depression (H)      psychiatrist: dr Patricia Reyes       PSHx: reviewed  Past Surgical History:   Procedure Laterality Date     COLPOSCOPY CERVIX, BIOPSY CERVIX, ENDOCERVICAL CURETTAGE, COMBINED  9/19/2012        Soc Hx: No daily alcohol, no smoking  Social History     Social History     Marital status:      Spouse name: N/A     Number of children: N/A     Years of education: N/A     Occupational History     Not on file.     Social History Main Topics     Smoking status: Never Smoker     Smokeless tobacco: Never Used     Alcohol use No     Drug use: No     Sexual activity: Yes     Partners: Male     Birth control/ protection: Pill     Other Topics Concern     Not on file     Social History Narrative        Fam Hx: reviewed  Family History   Problem Relation Age of Onset     Cerebrovascular Disease Maternal Grandmother 60     smoker     Thyroid Disease Maternal Aunt      grave's dz     Asthma Mother      Asthma Father      Prostate Cancer Maternal Grandfather      Thyroid Disease Sister      Diabetes Other      Glaucoma No family hx  "of      Macular Degeneration No family hx of          Screening: reviewed      All: reviewed    Meds: reviewed  Current Outpatient Prescriptions   Medication Sig Dispense Refill     albuterol 90 MCG/ACT inhaler Inhale 1-2 puffs into the lungs every 6 hours as needed for shortness of breath / dyspnea. 3 Inhaler 3     budesonide-formoterol (SYMBICORT) 80-4.5 MCG/ACT inhaler Inhale 2 puffs into the lungs 2 times daily 3 Inhaler 3     desvenlafaxine (PRISTIQ) 50 MG 24 hr tablet Take 1 tablet by mouth daily.       glycopyrrolate (ROBINUL) 1 MG tablet   11     LOW-OGESTREL 0.3-30 MG-MCG per tablet TAKE ONE TABLET BY MOUTH ONE TIME DAILY  84 tablet 0     spironolactone (ALDACTONE) 50 MG tablet   3       OBJECTIVE:                                                    Physical Exam :  Blood pressure 104/72, pulse 89, temperature 98.1  F (36.7  C), temperature source Oral, resp. rate 16, height 5' 2\" (1.575 m), weight 173 lb 1.6 oz (78.5 kg), SpO2 98 %, not currently breastfeeding.     NAD, appears comfortable  Skin clear, no rashes  HEENT: PERRLA, EOMI, anicteric sclera, pink conjunctiva, external ears appear normal, bilateral tympanic membranes clinically normal, oropharynx normal color.  Neck: supple, no JVD,  no thyroidmegaly  Lymph nodes non palpable in the cervical, supraclavicular axillaries, inguinal areas  Chest: clear to auscultation with good respiratory effort  Cardiac: S1S2, RRR, no mgr appreciated  Abdomen: soft, not tender, not distended, audible bowel sound, no hepatosplenomegaly, no palpable masses, no abdominal bruits  Extremities: no cyanosis, clubbing or edema.   Neuro: A, Ox3, no focal signs.  Breast exam in supine and erect position: they are symmetrical, no skin changes, no tenderness or nodes on palpation. Nipples are erect, no skin lesions, no discharge on pressure.    Pelvic exam: Normal external genitals, normal appearing perineum, normal appearing urethra,  vaginal mucosa pink, no discharge, Cervix " appears normal, Pap smear obtained. On bimanual exam, I did not feel any uterus or ovarian masses, and she denies any tenderness.         Shirley Rico MD  Internal Medicine        SUBJECTIVE:   CC: Althea Lopez is an 28 year old woman who presents for preventive health visit.     Physical   Annual:     Getting at least 3 servings of Calcium per day:  Yes    Bi-annual eye exam:  Yes    Dental care twice a year:  Yes    Sleep apnea or symptoms of sleep apnea:  None    Diet:  Regular (no restrictions)    Frequency of exercise:  2-3 days/week    Duration of exercise:  45-60 minutes    Taking medications regularly:  Yes    Medication side effects:  Other    Additional concerns today:  YES            Today's PHQ-2 Score:   PHQ-2 ( 1999 [employer]) 8/13/2018   Q1: Little interest or pleasure in doing things 1   Q2: Feeling down, depressed or hopeless 1   PHQ-2 Score 2   Q1: Little interest or pleasure in doing things Several days   Q2: Feeling down, depressed or hopeless Several days   PHQ-2 Score 2       Abuse: Current or Past(Physical, Sexual or Emotional)- No  Do you feel safe in your environment - Yes    Social History   Substance Use Topics     Smoking status: Never Smoker     Smokeless tobacco: Never Used     Alcohol use No     Alcohol Use 8/13/2018   If you drink alcohol do you typically have greater than 3 drinks per day OR greater than 7 drinks per week? No   No flowsheet data found.    Reviewed orders with patient.  Reviewed health maintenance and updated orders accordingly - Yes          Pertinent mammograms are reviewed under the imaging tab.  History of abnormal Pap smear:   PAP / HPV Latest Ref Rng & Units 6/2/2017 2/10/2016 10/20/2015   PAP - NIL LSIL(A) LSIL(A)   HPV 16 DNA NEG Negative Negative Negative   HPV 18 DNA NEG Negative Negative Negative   OTHER HR HPV NEG Negative Positive(A) Positive(A)     Reviewed and updated as needed this visit by clinical staff  Tobacco  Allergies  Meds   "Med Hx  Surg Hx  Fam Hx  Soc Hx        Reviewed and updated as needed this visit by Provider            Review of Systems       OBJECTIVE:   /72 (BP Location: Right arm, Patient Position: Chair, Cuff Size: Adult Large)  Pulse 89  Temp 98.1  F (36.7  C) (Oral)  Resp 16  Ht 5' 2\" (1.575 m)  Wt 173 lb 1.6 oz (78.5 kg)  SpO2 98%  BMI 31.66 kg/m2  Physical Exam        COUNSELING:  Reviewed preventive health counseling, as reflected in patient instructions       Regular exercise       Healthy diet/nutrition    BP Readings from Last 1 Encounters:   08/13/18 104/72     Estimated body mass index is 31.66 kg/(m^2) as calculated from the following:    Height as of this encounter: 5' 2\" (1.575 m).    Weight as of this encounter: 173 lb 1.6 oz (78.5 kg).      Weight management plan: Discussed healthy diet and exercise guidelines and patient will follow up in 12 months in clinic to re-evaluate.     reports that she has never smoked. She has never used smokeless tobacco.      Counseling Resources:  ATP IV Guidelines  Pooled Cohorts Equation Calculator  Breast Cancer Risk Calculator  FRAX Risk Assessment  ICSI Preventive Guidelines  Dietary Guidelines for Americans, 2010  USDA's MyPlate  ASA Prophylaxis  Lung CA Screening    Shirley Siu MD  [facility]  Answers for HPI/ROS submitted by the patient on 8/13/2018   PHQ-2 Score: 2    "

## 2018-08-13 NOTE — MR AVS SNAPSHOT
After Visit Summary   8/13/2018    Althea Lopez    MRN: [id number]           Patient Information     Date Of Birth          1990        Visit Information        Provider Department      8/13/2018 8:20 AM Shirley Siu MD [facility]        Today's Diagnoses     Routine general medical examination at a health care facility    -  1    Heat intolerance        Encounter for surveillance of other contraceptive          Care Instructions      Plan:  1. Labs   2. Birth control pills - renewed     Preventive Health Recommendations  Female Ages 26 - 39  Yearly exam:   See your health care provider every year in order to    Review health changes.     Discuss preventive care.      Review your medicines if you your doctor has prescribed any.    Until age 30: Get a Pap test every three years (more often if you have had an abnormal result).    After age 30: Talk to your doctor about whether you should have a Pap test every 3 years or have a Pap test with HPV screening every 5 years.   You do not need a Pap test if your uterus was removed (hysterectomy) and you have not had cancer.  You should be tested each year for STDs (sexually transmitted diseases), if you're at risk.   Talk to your provider about how often to have your cholesterol checked.  If you are at risk for diabetes, you should have a diabetes test (fasting glucose).  Shots: Get a flu shot each year. Get a tetanus shot every 10 years.   Nutrition:     Eat at least 5 servings of fruits and vegetables each day.    Eat whole-grain bread, whole-wheat pasta and brown rice instead of white grains and rice.    Get adequate Calcium and Vitamin D.     Lifestyle    Exercise at least 150 minutes a week (30 minutes a day, 5 days of the week). This will help you control your weight and prevent disease.    Limit alcohol to one drink per day.    No smoking.     Wear sunscreen to prevent skin cancer.    See your  "dentist every six months for an exam and cleaning.            Follow-ups after your visit        Who to contact     If you have questions or need follow up information about today's clinic visit or your schedule please contact [facility] directly at [phone].  Normal or non-critical lab and imaging results will be communicated to you by [employer], letter or phone within 4 business days after the clinic has received the results. If you do not hear from us within 7 days, please contact the clinic through [employer] or phone. If you have a critical or abnormal lab result, we will notify you by phone as soon as possible.  Submit refill requests through [employer] or call your pharmacy and they will forward the refill request to us. Please allow 3 business days for your refill to be completed.          Additional Information About Your Visit        [employer] Information     [employer] gives you secure access to your electronic health record. If you see a primary care provider, you can also send messages to your care team and make appointments. If you have questions, please call your primary care clinic.  If you do not have a primary care provider, please call [phone] and they will assist you.        Care EveryWhere ID     This is your Care EveryWhere ID. This could be used by other organizations to access your [place] medical records  [id number]        Your Vitals Were     Pulse Temperature Respirations Height Pulse Oximetry BMI (Body Mass Index)    89 98.1  F (36.7  C) (Oral) 16 5' 2\" (1.575 m) 98% 31.66 kg/m2       Blood Pressure from Last 3 Encounters:   08/13/18 104/72   09/12/17 100/68   08/01/17 108/74    Weight from Last 3 Encounters:   08/13/18 173 lb 1.6 oz (78.5 kg)   09/12/17 173 lb 1.6 oz (78.5 kg)   08/01/17 170 lb 1.6 oz (77.2 kg)              We Performed the Following     CBC with platelets     Comprehensive metabolic panel     Lipid panel reflex to direct LDL Fasting     TSH with free T4 " reflex          Today's Medication Changes          These changes are accurate as of 8/13/18  8:50 AM.  If you have any questions, ask your nurse or doctor.               These medicines have changed or have updated prescriptions.        Dose/Directions    norgestrel-ethinyl estradiol 0.3-30 MG-MCG per tablet   Commonly known as:  LOW-OGESTREL   This may have changed:  See the new instructions.   Used for:  Encounter for surveillance of other contraceptive   Changed by:  Shirley Siu MD        Dose:  1 tablet   Take 1 tablet by mouth daily   Quantity:  84 tablet   Refills:  4            Where to get your medicines      These medications were sent to [id number] - [place], [place] - [address]  [address] [facility] [address]    Hours:  9Am-9Pm (M-F) 9Am-6Pm (S&S) Phone:  [phone]     norgestrel-ethinyl estradiol 0.3-30 MG-MCG per tablet                Primary Care Provider Office Phone # Fax #    Shirley Siu [phone]       [address] [facility] [address]        Equal Access to Services     [facility]: Hadii aad ku hadasho Soevreali, waaxda luqadaha, qaybta kaalmada adeegyada, felicia hutton . So [facility] [phone].    ATENCIÓN: Si habla español, tiene a scanlon disposición servicios gratuitos de asistencia lingüística. [facility] [phone].    We comply with applicable federal civil rights laws and [place] laws. We do not discriminate on the basis of race, color, national origin, age, disability, sex, sexual orientation, or gender identity.            Thank you!     Thank you for choosing [facility]  for your care. Our goal is always to provide you with excellent care. Hearing back from our patients is one way we can continue to improve our services. Please take a few minutes to complete the written survey that you may receive in the mail after your visit with us. Thank you!              Your Updated Medication List - Protect others around you: Learn how to safely use, store and throw away your medicines at www.disposemymeds.org.          This list is accurate as of 8/13/18  8:50 AM.  Always use your most recent med list.                   Brand Name Dispense Instructions for use Diagnosis    albuterol 90 MCG/ACT inhaler     3 Inhaler    Inhale 1-2 puffs into the lungs every 6 hours as needed for shortness of breath / dyspnea.    Allergic rhinitis, cause unspecified       glycopyrrolate 1 MG tablet    ROBINUL          norgestrel-ethinyl estradiol 0.3-30 MG-MCG per tablet    LOW-OGESTREL    84 tablet    Take 1 tablet by mouth daily    Encounter for surveillance of other contraceptive       PRISTIQ 50 MG 24 hr tablet   Generic drug:  desvenlafaxine succinate      Take 1 tablet by mouth daily.        spironolactone 50 MG tablet    ALDACTONE          SYMBICORT 80-4.5 MCG/ACT Inhaler   Generic drug:  budesonide-formoterol     3 Inhaler    Inhale 2 puffs into the lungs 2 times daily    Intermittent asthma

## 2018-08-13 NOTE — PATIENT INSTRUCTIONS
Plan:  1. Labs   2. Birth control pills - renewed     Preventive Health Recommendations  Female Ages 26 - 39  Yearly exam:   See your health care provider every year in order to    Review health changes.     Discuss preventive care.      Review your medicines if you your doctor has prescribed any.    Until age 30: Get a Pap test every three years (more often if you have had an abnormal result).    After age 30: Talk to your doctor about whether you should have a Pap test every 3 years or have a Pap test with HPV screening every 5 years.   You do not need a Pap test if your uterus was removed (hysterectomy) and you have not had cancer.  You should be tested each year for STDs (sexually transmitted diseases), if you're at risk.   Talk to your provider about how often to have your cholesterol checked.  If you are at risk for diabetes, you should have a diabetes test (fasting glucose).  Shots: Get a flu shot each year. Get a tetanus shot every 10 years.   Nutrition:     Eat at least 5 servings of fruits and vegetables each day.    Eat whole-grain bread, whole-wheat pasta and brown rice instead of white grains and rice.    Get adequate Calcium and Vitamin D.     Lifestyle    Exercise at least 150 minutes a week (30 minutes a day, 5 days of the week). This will help you control your weight and prevent disease.    Limit alcohol to one drink per day.    No smoking.     Wear sunscreen to prevent skin cancer.    See your dentist every six months for an exam and cleaning.

## 2018-08-14 ASSESSMENT — ASTHMA QUESTIONNAIRES: ACT_TOTALSCORE: 24

## 2018-08-15 ENCOUNTER — MYC MEDICAL ADVICE (OUTPATIENT)
Dept: OPTOMETRY | Facility: CLINIC | Age: 28
End: 2018-08-15

## 2018-08-16 LAB
COPATH REPORT: ABNORMAL
PAP: ABNORMAL

## 2018-08-17 LAB
FINAL DIAGNOSIS: NORMAL
HPV HR 12 DNA CVX QL NAA+PROBE: NEGATIVE
HPV16 DNA SPEC QL NAA+PROBE: NEGATIVE
HPV18 DNA SPEC QL NAA+PROBE: NEGATIVE
SPECIMEN DESCRIPTION: NORMAL
SPECIMEN SOURCE CVX/VAG CYTO: NORMAL

## 2018-08-17 NOTE — TELEPHONE ENCOUNTER
Althea called and would like to have her prescription writen for contacts to be Total daily comfort lens. She doesn't like the [employer]  [employer]qua Comfort. I told her that Dr. Kamara was out and would call her on Monday with any questions and let her know what she had thought about her request and if she approved it or when it was done.     Emily ARREOLA  Opt. Tech.  08/17/2018

## 2018-08-27 ENCOUNTER — TELEPHONE (OUTPATIENT)
Dept: INTERNAL MEDICINE | Facility: CLINIC | Age: 28
End: 2018-08-27

## 2018-08-27 NOTE — TELEPHONE ENCOUNTER
"Dr. Siu - please advise.  Pap result notes copied and pasted below [2nd request for provider review]:    \"Notes Recorded by Alessandra Joseph on 8/20/2018 at 8:08 AM  Dr. Siu,    Please review and advise on follow up as pap hx does not follow current ASCCP guidelines.  28 year old with abnormal pap hx below.  Current Dx ASCUS pap, neg HR HPV result.    Would you like pt to have a colp done? Or other follow up?    Pap hx:  3/15/11 ASCUS  positive for HPV 53 DNA (low risk) @ age 20  5/10/12  LSIL  9/19/12  Tyler: SAL 1; LSIL pap   3/16/13 NIL pap, + HR HPV (not 16/18). Plan in 6 months (9/2013)  3/25/14 Dx ASCUS pap, neg HR HPV. Plan: colp within 3 months.   Tyler was not done.   5/15/15 Pap LSIL. Plan: colp within 3 months.   10/19/15 Tyler SAL 2. Dx pap LSIL with pos HPV. Plan: pap/HPV in 6 months or LEEP or cryo.   2/10/16 Tyler SAL I. Dx pap LSIL with pos HPV. Plan: cotest in 1 year.   06/02/17 NIL, Neg HPV. Plan: cotest in 1 year  8/13/18 (CURRENT) Dx ASCUS pap, neg HR HPV. Plan pending provider\"       Thanks!  CHEIKH Juarez, RN - Pap Tracking    "

## 2018-08-28 NOTE — TELEPHONE ENCOUNTER
Result Notes   Notes Recorded by Shirley Siu MD on 8/28/2018 at 6:21 AM  Colposcopy and f/u w OBGYN  Thanks  ------

## 2018-12-16 ENCOUNTER — TELEPHONE (OUTPATIENT)
Dept: INTERNAL MEDICINE | Facility: CLINIC | Age: 28
End: 2018-12-16

## 2018-12-16 NOTE — TELEPHONE ENCOUNTER
Kirsty,    Please call or send My Chart message or both    Thanks     Alessandra Joseph, MD Dr. Salbador Sanabria,   YELENA - Pt has been notified of need for colp. Reminders have been made per Pap Tracking workflow. Patient has not scheduled. Tracking has now been updated for our team to send a 6 month colp/pap smear reminder letter, followed by a reminder call. If pt does not have the colp/pap smear done at that time, she will be lost to Pap Tracking follow-up.     CHEIKH Juarez, RN - Pap Tracking

## 2018-12-19 NOTE — TELEPHONE ENCOUNTER
Message left for pt to either return call or to check her MyChart for message from October and respond that way if she prefers.

## 2019-01-02 ENCOUNTER — MYC REFILL (OUTPATIENT)
Dept: INTERNAL MEDICINE | Facility: CLINIC | Age: 29
End: 2019-01-02

## 2019-01-02 DIAGNOSIS — Z30.49 ENCOUNTER FOR SURVEILLANCE OF OTHER CONTRACEPTIVE: ICD-10-CM

## 2019-01-04 NOTE — TELEPHONE ENCOUNTER
Patient is requesting her birth control pills to be refilled.    Medication was e-scribed to pharmacy 8-13-18 #84 with 4 refills.  Should last until August 2019.  Patient informed via [employer].

## 2019-03-09 ENCOUNTER — HEALTH MAINTENANCE LETTER (OUTPATIENT)
Age: 29
End: 2019-03-09

## 2019-03-19 ENCOUNTER — OFFICE VISIT (OUTPATIENT)
Dept: INTERNAL MEDICINE | Facility: CLINIC | Age: 29
End: 2019-03-19
Payer: COMMERCIAL

## 2019-03-19 VITALS
BODY MASS INDEX: 33.03 KG/M2 | RESPIRATION RATE: 18 BRPM | TEMPERATURE: 98 F | OXYGEN SATURATION: 98 % | SYSTOLIC BLOOD PRESSURE: 108 MMHG | HEIGHT: 62 IN | HEART RATE: 70 BPM | WEIGHT: 179.5 LBS | DIASTOLIC BLOOD PRESSURE: 80 MMHG

## 2019-03-19 DIAGNOSIS — Z00.00 ROUTINE GENERAL MEDICAL EXAMINATION AT A HEALTH CARE FACILITY: ICD-10-CM

## 2019-03-19 DIAGNOSIS — R19.7 ACUTE DIARRHEA: Primary | ICD-10-CM

## 2019-03-19 DIAGNOSIS — R68.89 HEAT INTOLERANCE: ICD-10-CM

## 2019-03-19 DIAGNOSIS — J45.20 MILD INTERMITTENT ASTHMA WITHOUT COMPLICATION: ICD-10-CM

## 2019-03-19 LAB
ALBUMIN SERPL-MCNC: 3.9 G/DL (ref 3.4–5)
ALP SERPL-CCNC: 49 U/L (ref 40–150)
ALT SERPL W P-5'-P-CCNC: 22 U/L (ref 0–50)
ANION GAP SERPL CALCULATED.3IONS-SCNC: 9 MMOL/L (ref 3–14)
AST SERPL W P-5'-P-CCNC: 16 U/L (ref 0–45)
BILIRUB SERPL-MCNC: 0.3 MG/DL (ref 0.2–1.3)
BUN SERPL-MCNC: 13 MG/DL (ref 7–30)
CALCIUM SERPL-MCNC: 9.2 MG/DL (ref 8.5–10.1)
CHLORIDE SERPL-SCNC: 106 MMOL/L (ref 94–109)
CHOLEST SERPL-MCNC: 171 MG/DL
CO2 SERPL-SCNC: 24 MMOL/L (ref 20–32)
CREAT SERPL-MCNC: 0.91 MG/DL (ref 0.52–1.04)
ERYTHROCYTE [DISTWIDTH] IN BLOOD BY AUTOMATED COUNT: 12.4 % (ref 10–15)
GFR SERPL CREATININE-BSD FRML MDRD: 85 ML/MIN/{1.73_M2}
GLUCOSE SERPL-MCNC: 99 MG/DL (ref 70–99)
HCT VFR BLD AUTO: 42.5 % (ref 35–47)
HDLC SERPL-MCNC: 52 MG/DL
HGB BLD-MCNC: 13.8 G/DL (ref 11.7–15.7)
LDLC SERPL CALC-MCNC: 102 MG/DL
MCH RBC QN AUTO: 31.4 PG (ref 26.5–33)
MCHC RBC AUTO-ENTMCNC: 32.5 G/DL (ref 31.5–36.5)
MCV RBC AUTO: 97 FL (ref 78–100)
NONHDLC SERPL-MCNC: 119 MG/DL
PLATELET # BLD AUTO: 278 10E9/L (ref 150–450)
POTASSIUM SERPL-SCNC: 4.2 MMOL/L (ref 3.4–5.3)
PROT SERPL-MCNC: 7.1 G/DL (ref 6.8–8.8)
RBC # BLD AUTO: 4.39 10E12/L (ref 3.8–5.2)
SODIUM SERPL-SCNC: 139 MMOL/L (ref 133–144)
TRIGL SERPL-MCNC: 84 MG/DL
TSH SERPL DL<=0.005 MIU/L-ACNC: 2.85 MU/L (ref 0.4–4)
WBC # BLD AUTO: 8.4 10E9/L (ref 4–11)

## 2019-03-19 PROCEDURE — 80053 COMPREHEN METABOLIC PANEL: CPT | Performed by: INTERNAL MEDICINE

## 2019-03-19 PROCEDURE — 36415 COLL VENOUS BLD VENIPUNCTURE: CPT | Performed by: INTERNAL MEDICINE

## 2019-03-19 PROCEDURE — 85027 COMPLETE CBC AUTOMATED: CPT | Performed by: INTERNAL MEDICINE

## 2019-03-19 PROCEDURE — 84443 ASSAY THYROID STIM HORMONE: CPT | Performed by: INTERNAL MEDICINE

## 2019-03-19 PROCEDURE — 80061 LIPID PANEL: CPT | Performed by: INTERNAL MEDICINE

## 2019-03-19 PROCEDURE — 99214 OFFICE O/P EST MOD 30 MIN: CPT | Performed by: INTERNAL MEDICINE

## 2019-03-19 ASSESSMENT — MIFFLIN-ST. JEOR: SCORE: 1497.46

## 2019-03-19 NOTE — PROGRESS NOTES
Dr Rico's note      Patient's instructions / PLAN:                                                        Plan:  1. Labs today   2. Try to take Imodium before a big meal  3. If the tests are negative and the diarrhea persists, consider colonoscopy   Appt. Line [phone] with GI            ASSESSMENT & PLAN:                                                        (R19.7) Acute diarrhea  Comment:   Plan: Enteric Bacteria and Virus Panel by EDD Stool,         Clostridium difficile Toxin B PCR, Ova and         Parasite Exam Routine, Giardia antigen,         GASTROENTEROLOGY ADULT REF PROCEDURE ONLY         [facility]  [phone]; [facility]            (Z00.00) Routine general medical examination at a health care facility  Comment:   Plan:     (R68.89) Heat intolerance  Comment:   Plan:      (J45.20) Intermittent asthma  (primary encounter diagnosis)  Comment: Controlled    Plan: Asthma Action Plan (AAP)    Chief complaint:                                                      Diarrhea     SUBJECTIVE:   Althea Lopez is a 28 year old female who presents to clinic today for the following health issues:        *Gastrointestinal Problem-Intermittent diarrhea for the past month, seems to occur after eating. LOV 8/13/18  -FYI-She has colposcopy scheduled for 4/5/19    -- only after she eats, she gets mild cramps and then watery diarrhea  -- couple of times she noticed little blood when she wiped   -- appetite is good, she doesn't feel ill  -- she has tried to stop diaries, didn;t make a diff  -- wt loss because she acvoids food  -- traveled to [place] in Dec, but the diarrhea started in Feb  -- nobody at home with the same symtomspo       Review of Systems:                                                      ROS: negative for fever, chills, cough, wheezes, chest pain, shortness of breath, vomiting, abdominal pain, leg swelling         OBJECTIVE:             Physical exam:  Blood pressure  "108/80, pulse 70, temperature 98  F (36.7  C), temperature source Oral, resp. rate 18, height 1.575 m (5' 2\"), weight 81.4 kg (179 lb 8 oz), last menstrual period 03/18/2019, SpO2 98 %, not currently breastfeeding.   NAD, appears comfortable  Skin: no rashes   Neck: supple, no JVD,  No thyroidmegaly. Lymph nodes nonpalpable cervical and supraclavicular.  Chest: clear to auscultation bilaterally, good respiratory effort  Heart: S1 S2, RRR, no mgr appreciated  Abdomen: soft, not tender, no hepatosplenomegaly or masses appreciated, no abdominal bruit, present bowel sounds  Extremities: no edema,   Neurologic: A, Ox3, no focal signs appreciated    PMHx: reviewed  Past Medical History:   Diagnosis Date     Allergic rhinitis, cause unspecified     sees allergist for allergy induced asthma.     ASCUS on Pap smear 3/15/11, 3/25/14, 8/13/18    + HPV < 20 yrs of age     ASTHMA UNSPECIFIED 7/27/2004     Frequent UTI      History of colposcopy with cervical biopsy 10/20/15, 2/10/16    SAL 2, SAL I     Intermittent asthma      Intermittent asthma      LSIL (low grade squamous intraepithelial lesion) on Pap smear 5/10/12, 9/19/12, 5/15/15, 10/19/15, 2/10/16    HPV 66 High Risk     Moderate major depression (H)      psychiatrist: dr Patricia Reyes      PSHx: reviewed  Past Surgical History:   Procedure Laterality Date     COLPOSCOPY CERVIX, BIOPSY CERVIX, ENDOCERVICAL CURETTAGE, COMBINED  9/19/2012        Meds: reviewed  Current Outpatient Medications   Medication Sig Dispense Refill     albuterol 90 MCG/ACT inhaler Inhale 1-2 puffs into the lungs every 6 hours as needed for shortness of breath / dyspnea. 3 Inhaler 3     budesonide-formoterol (SYMBICORT) 80-4.5 MCG/ACT inhaler Inhale 2 puffs into the lungs 2 times daily 3 Inhaler 3     desvenlafaxine (PRISTIQ) 50 MG 24 hr tablet Take 1 tablet by mouth daily.       glycopyrrolate (ROBINUL) 1 MG tablet   11     norgestrel-ethinyl estradiol (LOW-OGESTREL) 0.3-30 MG-MCG per tablet Take 1 " tablet by mouth daily 84 tablet 4     spironolactone (ALDACTONE) 50 MG tablet   3       Soc Hx: reviewed  Fam Hx: reviewed          Shirley Rico MD  Internal Medicine

## 2019-03-19 NOTE — NURSING NOTE
"/80 (BP Location: Left arm, Patient Position: Sitting, Cuff Size: Adult Large)   Pulse 70   Temp 98  F (36.7  C) (Oral)   Resp 18   Ht 1.575 m (5' 2\")   Wt 81.4 kg (179 lb 8 oz)   LMP 03/18/2019 (Exact Date)   SpO2 98%   Breastfeeding? No   BMI 32.83 kg/m    Has future appointment scheduled for colposcopy.   Kirsty Nazario CMA    "

## 2019-03-19 NOTE — PATIENT INSTRUCTIONS
Plan:  1. Labs today   2. Try to take Imodium before a big meal  3. If the tests are negative and the diarrhea persists, consider colonoscopy   Appt. Line [phone] with GI

## 2019-03-19 NOTE — LETTER
My Asthma Action Plan  Name: Althea Lopez   YOB: 1990  Date: 3/19/2019   My doctor: Shirley Siu MD   My clinic: [facility]        My Control Medicine: Budesonide + formoterol (Symbicort) -  80/4.5 mcg two puffs two times per day  My Rescue Medicine: Albuterol (Proair/Ventolin/Proventil) inhaler two puffs every 6 hours as needed   My Asthma Severity: intermittent  Avoid your asthma triggers: Patient is unaware of triggers               GREEN ZONE   Good Control    I feel good    No cough or wheeze    Can work, sleep and play without asthma symptoms       Take your asthma control medicine every day.     1. If exercise triggers your asthma, take your rescue medication    15 minutes before exercise or sports, and    During exercise if you have asthma symptoms  2. Spacer to use with inhaler: If you have a spacer, make sure to use it with your inhaler             YELLOW ZONE Getting Worse  I have ANY of these:    I do not feel good    Cough or wheeze    Chest feels tight    Wake up at night   1. Keep taking your Green Zone medications  2. Start taking your rescue medicine:    every 20 minutes for up to 1 hour. Then every 4 hours for 24-48 hours.  3. If you stay in the Yellow Zone for more than 12-24 hours, contact your doctor.  4. If you do not return to the Green Zone in 12-24 hours or you get worse, start taking your oral steroid medicine if prescribed by your provider.           RED ZONE Medical Alert - Get Help  I have ANY of these:    I feel awful    Medicine is not helping    Breathing getting harder    Trouble walking or talking    Nose opens wide to breathe       1. Take your rescue medicine NOW  2. If your provider has prescribed an oral steroid medicine, start taking it NOW  3. Call your doctor NOW  4. If you are still in the Red Zone after 20 minutes and you have not reached your doctor:    Take your rescue medicine again and    Call 911 or go  to the emergency room right away    See your regular doctor within 2 weeks of an Emergency Room or Urgent Care visit for follow-up treatment.          Annual Reminders:  Meet with Asthma Educator,  Flu Shot in the Fall, consider Pneumonia Vaccination for patients with asthma (aged 19 and older).    Pharmacy[facility]    [id number] - [place], [place] [facility] [address] [id number] - [place], [place] - [address] [facility] [address]                      Asthma Triggers  How To Control Things That Make Your Asthma Worse    Triggers are things that make your asthma worse.  Look at the list below to help you find your triggers and what you can do about them.  You can help prevent asthma flare-ups by staying away from your triggers.      Trigger                                                          What you can do   Cigarette Smoke  Tobacco smoke can make asthma worse. Do not allow smoking in your home, car or around you.  Be sure no one smokes at a child s day care or school.  If you smoke, ask your health care provider for ways to help you quit.  Ask family members to quit too.  Ask your health care provider for a referral to Quit Plan to help you quit smoking, or call 6-978-792-PLAN.     Colds, Flu, Bronchitis  These are common triggers of asthma. Wash your hands often.  Don t touch your eyes, nose or mouth.  Get a flu shot every year.     Dust Mites  These are tiny bugs that live in cloth or carpet. They are too small to see. Wash sheets and blankets in hot water every week.   Encase pillows and mattress in dust mite proof covers.  Avoid having carpet if you can. If you have carpet, vacuum weekly.   Use a dust mask and HEPA vacuum.   Pollen and Outdoor Mold  Some people are allergic to trees, grass, or weed pollen, or molds. Try to keep your windows closed.  Limit time out doors when pollen count is high.   Ask you health care provider about taking medicine during allergy season.     Animal  Dander  Some people are allergic to skin flakes, urine or saliva from pets with fur or feathers. Keep pets with fur or feathers out of your home.    If you can t keep the pet outdoors, then keep the pet out of your bedroom.  Keep the bedroom door closed.  Keep pets off cloth furniture and away from stuffed toys.     Mice, Rats, and Cockroaches  Some people are allergic to the waste from these pests.   Cover food and garbage.  Clean up spills and food crumbs.  Store grease in the refrigerator.   Keep food out of the bedroom.   Indoor Mold  This can be a trigger if your home has high moisture. Fix leaking faucets, pipes, or other sources of water.   Clean moldy surfaces.  Dehumidify basement if it is damp and smelly.   Smoke, Strong Odors, and Sprays  These can reduce air quality. Stay away from strong odors and sprays, such as perfume, powder, hair spray, paints, smoke incense, paint, cleaning products, candles and new carpet.   Exercise or Sports  Some people with asthma have this trigger. Be active!  Ask your doctor about taking medicine before sports or exercise to prevent symptoms.    Warm up for 5-10 minutes before and after sports or exercise.     Other Triggers of Asthma  Cold air:  Cover your nose and mouth with a scarf.  Sometimes laughing or crying can be a trigger.  Some medicines and food can trigger asthma.

## 2019-03-20 ASSESSMENT — ASTHMA QUESTIONNAIRES: ACT_TOTALSCORE: 23

## 2019-04-22 ENCOUNTER — TRANSFERRED RECORDS (OUTPATIENT)
Dept: HEALTH INFORMATION MANAGEMENT | Facility: CLINIC | Age: 29
End: 2019-04-22

## 2019-05-03 ENCOUNTER — HEALTH MAINTENANCE LETTER (OUTPATIENT)
Age: 29
End: 2019-05-03

## 2019-05-31 ENCOUNTER — OFFICE VISIT (OUTPATIENT)
Dept: OBGYN | Facility: CLINIC | Age: 29
End: 2019-05-31
Payer: COMMERCIAL

## 2019-05-31 VITALS
DIASTOLIC BLOOD PRESSURE: 62 MMHG | HEIGHT: 62 IN | SYSTOLIC BLOOD PRESSURE: 100 MMHG | BODY MASS INDEX: 33.13 KG/M2 | WEIGHT: 180 LBS

## 2019-05-31 DIAGNOSIS — Z01.812 PRE-PROCEDURE LAB EXAM: Primary | ICD-10-CM

## 2019-05-31 DIAGNOSIS — R87.610 ASCUS OF CERVIX WITH NEGATIVE HIGH RISK HPV: ICD-10-CM

## 2019-05-31 LAB — HCG, QUAL URINE: NEGATIVE

## 2019-05-31 PROCEDURE — 57454 BX/CURETT OF CERVIX W/SCOPE: CPT | Performed by: OBSTETRICS & GYNECOLOGY

## 2019-05-31 PROCEDURE — 88305 TISSUE EXAM BY PATHOLOGIST: CPT | Performed by: OBSTETRICS & GYNECOLOGY

## 2019-05-31 ASSESSMENT — MIFFLIN-ST. JEOR: SCORE: 1494.72

## 2019-05-31 NOTE — PROGRESS NOTES
"  INDICATIONS:                                                    Is a pregnancy test required: Yes.  Was it positive or negative?  Negative  Was a consent obtained?  Yes    Today's PHQ-2 Score:   PHQ-2 ( 1999 Pfizer) 8/13/2018   Q1: Little interest or pleasure in doing things 1   Q2: Feeling down, depressed or hopeless 1   PHQ-2 Score 2   Q1: Little interest or pleasure in doing things Several days   Q2: Feeling down, depressed or hopeless Several days   PHQ-2 Score 2     Today's PHQ-9 Score:    PHQ-9 SCORE 5/10/2012   PHQ-9 Total Score 4     Today's GAD7 Score: 0    Althea Lopez, is a 29 year old female, who had a recent ASCUS pap.  HPV negative.  Yes prior history of abnormal pap. Here today for colposcopy. Discussed indication, risks of infection and bleeding.    Her last pap was   Lab Results   Component Value Date    PAP ASC-US 08/13/2018    .    PROCEDURE:                                                      Cervix is stained with acetic acid and viewed colposcopically. Squamocolumnar junction {IS/IS NOT:9024} visualized in it's entirety. {:728} . Biopsy done {YES / NO:502980::\"Yes\"}. Endocervical curretage {DONE:589761}    {GO TO THE IMAGE SECTION AND DRAW YOUR COLPO FINDINGS UNDER THE IMAGES, DELETE THIS LINK AND TYPE (DOT) IMAGES TO INSERT THE FINDINGS}     POST PROCEDURE:                                                      IMPRESSION: {COLP IMPRESSION:239376}    PLAN : Await the results of the biopsies.  Repeat pap in {[facility] pap:522856::\"12\"} months.  She  {[facility] Tolerance:894668}. There were no complications. Patient was discharged in stable condition.    Patient advised to call the clinic if excessive bleeding, pelvic pain, or fever.     Follow-up plan based on pathology results.    Michelle Rivera, DO    "

## 2019-05-31 NOTE — NURSING NOTE
"Chief Complaint   Patient presents with     Colposcopy     ASC-US       Initial /62 (BP Location: Left arm, Patient Position: Chair, Cuff Size: Adult Regular)   Ht 1.575 m (5' 2\")   Wt 81.6 kg (180 lb)   LMP 05/13/2019 (Exact Date)   Breastfeeding? No   BMI 32.92 kg/m   Estimated body mass index is 32.92 kg/m  as calculated from the following:    Height as of this encounter: 1.575 m (5' 2\").    Weight as of this encounter: 81.6 kg (180 lb).  BP completed using cuff size: regular    Questioned patient about current smoking habits.  Pt. has never smoked.      No obstetric history on file.    The following HM Due: NONE  Diana Zavaleta CMA    "

## 2019-05-31 NOTE — PROGRESS NOTES
INDICATIONS:                                                    Is a pregnancy test required: Yes.  Was it positive or negative?  Negative  Was a consent obtained?  Yes    Today's PHQ-2 Score:   PHQ-2 ( 1999 Pfizer) 8/13/2018   Q1: Little interest or pleasure in doing things 1   Q2: Feeling down, depressed or hopeless 1   PHQ-2 Score 2   Q1: Little interest or pleasure in doing things Several days   Q2: Feeling down, depressed or hopeless Several days   PHQ-2 Score 2     Today's PHQ-9 Score:    PHQ-9 SCORE 5/10/2012   PHQ-9 Total Score 4       Althea Lopez, is a 29 year old female, who has the following pap history:  LSIL, HPV HR other +  -> CIN1 (2/2016)  NIL, HPV neg (2017)  ASCUS, HPV neg (8/2018)    Here today for colposcopy. Discussed indication, risks of infection and bleeding.    Her last pap was   Lab Results   Component Value Date    PAP ASC-US 08/13/2018    .    PROCEDURE:                                                      Cervix is stained with acetic acid and viewed colposcopically. Squamocolumnar junction is not visualized in it's entirety. No visible lesions, no mosaicism, no punctation and no abnormal vasculature. Thin acetowhite rim (may be scarring).  12 and 8 o'clock biopsies done . Endocervical curretage Done       POST PROCEDURE:                                                      IMPRESSION:  Normal cervix to possible CIN1    PLAN : Await the results of the biopsies. There were no complications. Patient was discharged in stable condition.    Patient advised to call the clinic if excessive bleeding, pelvic pain, or fever.     Follow-up plan based on pathology results.    Michelle Rivera,

## 2019-06-04 LAB — COPATH REPORT: NORMAL

## 2019-07-06 ENCOUNTER — E-VISIT (OUTPATIENT)
Dept: INTERNAL MEDICINE | Facility: CLINIC | Age: 29
End: 2019-07-06

## 2019-07-06 DIAGNOSIS — N30.00 ACUTE CYSTITIS WITHOUT HEMATURIA: Primary | ICD-10-CM

## 2019-07-06 PROCEDURE — 99444 ZZC PHYSICIAN ONLINE EVALUATION & MANAGEMENT SERVICE: CPT | Performed by: INTERNAL MEDICINE

## 2019-07-07 RX ORDER — SULFAMETHOXAZOLE AND TRIMETHOPRIM 400; 80 MG/1; MG/1
1 TABLET ORAL 2 TIMES DAILY
Qty: 6 TABLET | Refills: 0 | Status: SHIPPED | OUTPATIENT
Start: 2019-07-07 | End: 2020-07-09

## 2019-08-14 DIAGNOSIS — Z30.49 ENCOUNTER FOR SURVEILLANCE OF OTHER CONTRACEPTIVE: ICD-10-CM

## 2019-08-14 NOTE — TELEPHONE ENCOUNTER
"Requested Prescriptions   Pending Prescriptions Disp Refills     norgestrel-ethinyl estradiol (LOW-OGESTREL) 0.3-30 MG-MCG tablet 84 tablet 4     Sig: Take 1 tablet by mouth daily       Contraceptives Protocol Passed - 8/14/2019  4:37 PM        Passed - Patient is not a current smoker if age is 35 or older        Passed - Recent (12 mo) or future (30 days) visit within the authorizing provider's specialty     Patient had office visit in the last 12 months or has a visit in the next 30 days with authorizing provider or within the authorizing provider's specialty.  See \"Patient Info\" tab in inbasket, or \"Choose Columns\" in Meds & Orders section of the refill encounter.              Passed - Medication is active on med list        Passed - No active pregnancy on record        Passed - No positive pregnancy test in past 12 months        Last Written Prescription Date:  08/13/2018  Last Fill Quantity: 84,  # refills: 4   Last office visit: 3/19/2019 with prescribing provider:  Dr. Rico   Future Office Visit:  None    "

## 2019-09-30 ENCOUNTER — HEALTH MAINTENANCE LETTER (OUTPATIENT)
Age: 29
End: 2019-09-30

## 2020-03-09 ENCOUNTER — TELEPHONE (OUTPATIENT)
Dept: INTERNAL MEDICINE | Facility: CLINIC | Age: 30
End: 2020-03-09

## 2020-03-09 NOTE — TELEPHONE ENCOUNTER
Pt calling requesting call back from nurse to discuss asthma symptoms. Pt was recently sick with what she thinks was the flu. She had fever, congestion, headache, fatigue and cough. She has gotten better but she is still experiencing asthma symptoms and would like to know what to do if or she should come in for an appointment. Pt can be reached at [phone]. OK to leave a detailed message. Please advise. Thanks.

## 2020-03-10 NOTE — TELEPHONE ENCOUNTER
Patient became sick last week and feels better except still having slight SOB at rest and with activity. There is no discernable difference in SOB whether active or at rest.  Using Advair daily and Albuterol inhaler as needed with little to no help.  Denies chest pain or fever.  Advised to schedule appt the end of the week or early next week to give it a little more time to resolve.  Scheduled for Tues. 3/17/20 and she will cancel if significantly better by then.  ANA Priest R.N.

## 2020-07-03 ENCOUNTER — VIRTUAL VISIT (OUTPATIENT)
Dept: FAMILY MEDICINE | Facility: OTHER | Age: 30
End: 2020-07-03

## 2020-07-03 ENCOUNTER — E-VISIT (OUTPATIENT)
Dept: INTERNAL MEDICINE | Facility: CLINIC | Age: 30
End: 2020-07-03
Payer: COMMERCIAL

## 2020-07-03 DIAGNOSIS — R30.0 DYSURIA: Primary | ICD-10-CM

## 2020-07-03 PROCEDURE — 99207 ZZC NO BILLABLE SERVICE THIS VISIT: CPT | Performed by: INTERNAL MEDICINE

## 2020-07-03 RX ORDER — SULFAMETHOXAZOLE/TRIMETHOPRIM 800-160 MG
1 TABLET ORAL 2 TIMES DAILY
Qty: 6 TABLET | Refills: 0 | Status: SHIPPED | OUTPATIENT
Start: 2020-07-03 | End: 2020-07-09

## 2020-07-03 NOTE — PROGRESS NOTES
"Date: 2020 08:05:24  Clinician: Livia Montalvo  Clinician NPI: [id number]  Patient: Althea Clark  Patient : 1990  Patient Address: [address] [place], [place] [address]  Patient Phone: [phone]  Visit Protocol: UTI  Patient Summary:  Althea is a 30 year old ( : 1990 ) female who initiated a Visit for a presumed bladder infection. When asked the question \"Please sign me up to receive news, health information and promotions. \", Althea responded \"No\".   Her symptoms started 1-3 days ago and consist of dysuria, urinary frequency, and urgency.   Symptom details   Urine color: The color of her urine is yellow.    Denied symptoms include foul-smelling urine, nausea, feeling as if the bladder is never empty, flank pain, abdominal pain, chills, vaginal discharge, urinary incontinence, vomiting, and vaginal itching. She does not feel feverish.   Althea has not used any over-the-counter medications or home remedies to relieve her current symptoms.  Precipitating events  Althea denies having a sexually transmitted disease.  Pertinent medical history  Althea has had a bladder infection before but has not had any in the past 12 months. Her current symptoms are similar to her previous bladder infection symptoms.   Sulfamethoxazole-trimethoprim (Bactrim DS) has been effective in treating her past bladder infections.   Althea has not been prescribed antibiotics to prevent frequent or repeated bladder infections in the past and does not get yeast infections when she takes antibiotics. She has not experienced problems or side effects with any of the common antibiotics used to treat bladder infections.   Althea does not have a history of kidney stones. She has not used a catheter or been a patient in a hospital or nursing home in the past 2 weeks.   Althea does not smoke or use smokeless tobacco.   She denies pregnancy and denies breastfeeding. She has menstruated in the past month.     " MEDICATIONS: Zoloft oral, Symbicort inhalation, albuterol sulfate inhalation, ALLERGIES: NKDA  Clinician Response:  Dear Althea,  Based on the information you have provided, you likely have an acute urinary tract infection, also called a bladder infection. Bladder infections occur when bacteria from the outside of the body enters the urinary tract. Any part of the urinary system can be infected, but the bladder is the most common.  Medication information  I am prescribing:     Sulfamethoxazole-trimethoprim (Bactrim DS) 800-160 mg oral tablet. Take 1 tablet by mouth every 12 hours for 3 days. There are no refills with this prescription.   Certain antibiotics such as Bactrim and Ciprofloxacin can cause your skin to be more sensitive to the sun. Exposure to the sun when taking these antibiotics may cause skin rash, itching, redness, or a severe sunburn. Be sure to avoid prolonged or direct exposure to the sun, especially between 10 am and 3 pm, if possible. Wear protective clothing and use sunscreen of SPF 30 or higher when you are outdoors.  The medication I prescribed for your bladder infection is an antibiotic. Continue taking the medication until it is gone even if you feel better. If you get an upset stomach while taking antibiotics, taking the medication with food can help.   Yeast infections can be a common side effect of antibiotics. The most common symptom of a yeast infection is itchiness in and around the vagina. Other signs and symptoms include burning, redness, or a thick, white vaginal discharge that looks like cottage cheese and does not have a bad smell.  Self care  Urination helps to flush bacteria from the urinary tract. For this reason, drinking water and urinating often helps relieve some urinary symptoms and can decrease your risk of getting bladder infections in the future.  Other steps you can take to prevent future bladder infections include:     Wipe front to back after using the bathroom     Urinate after sexual intercourse    Avoid using deodorant sprays, douches, or powders in the vaginal area     When to seek care  Please make an appointment to be seen in a clinic or urgent care if any of the following occur:     You develop new symptoms or your symptoms become worse    You have medication side effects that make it difficult to take them as prescribed    Your symptoms do not improve within 1-2 days of starting treatment    You have symptoms of a bladder infection that return shortly after completing treatment     It is possible to have an allergic reaction to an antibiotic even if you have not had one in the past. If you notice a new rash, significant swelling, or difficulty breathing, stop taking this medication immediately and go to a clinic or urgent care.   Diagnosis: Acute uncomplicated bladder infection  Diagnosis ICD: N39.0  Prescription: sulfamethoxazole-trimethoprim (Bactrim DS) 800-160 mg oral tablet 6 tablet, 3 days supply. Take 1 tablet by mouth every 12 hours for 3 days. Refills: 0, Refill as needed: no, Allow substitutions: yes  Pharmacy: [id number] - [facility] [phone] - [address] [place] [address]

## 2020-07-08 ASSESSMENT — ENCOUNTER SYMPTOMS
HEADACHES: 0
NERVOUS/ANXIOUS: 0
NAUSEA: 0
CHILLS: 0
BREAST MASS: 0
PALPITATIONS: 0
JOINT SWELLING: 0
COUGH: 0
HEMATOCHEZIA: 0
CONSTIPATION: 0
DYSURIA: 0
SORE THROAT: 0
WEAKNESS: 0
FEVER: 0
DIZZINESS: 0
DIARRHEA: 0
HEMATURIA: 0
ABDOMINAL PAIN: 0
MYALGIAS: 1
FREQUENCY: 0
EYE PAIN: 0
HEARTBURN: 1
PARESTHESIAS: 0
SHORTNESS OF BREATH: 0
ARTHRALGIAS: 0

## 2020-07-09 ENCOUNTER — OFFICE VISIT (OUTPATIENT)
Dept: INTERNAL MEDICINE | Facility: CLINIC | Age: 30
End: 2020-07-09
Payer: COMMERCIAL

## 2020-07-09 DIAGNOSIS — Z00.00 ROUTINE GENERAL MEDICAL EXAMINATION AT A HEALTH CARE FACILITY: Primary | ICD-10-CM

## 2020-07-09 DIAGNOSIS — F41.1 GAD (GENERALIZED ANXIETY DISORDER): ICD-10-CM

## 2020-07-09 PROCEDURE — G0145 SCR C/V CYTO,THINLAYER,RESCR: HCPCS | Performed by: INTERNAL MEDICINE

## 2020-07-09 PROCEDURE — 96127 BRIEF EMOTIONAL/BEHAV ASSMT: CPT | Performed by: INTERNAL MEDICINE

## 2020-07-09 PROCEDURE — 87624 HPV HI-RISK TYP POOLED RSLT: CPT | Performed by: INTERNAL MEDICINE

## 2020-07-09 PROCEDURE — 99213 OFFICE O/P EST LOW 20 MIN: CPT | Mod: 25 | Performed by: INTERNAL MEDICINE

## 2020-07-09 PROCEDURE — 99395 PREV VISIT EST AGE 18-39: CPT | Performed by: INTERNAL MEDICINE

## 2020-07-09 RX ORDER — SERTRALINE HYDROCHLORIDE 100 MG/1
100 TABLET, FILM COATED ORAL DAILY
Qty: 90 TABLET | Refills: 3 | Status: SHIPPED | OUTPATIENT
Start: 2020-07-09

## 2020-07-09 RX ORDER — SERTRALINE HYDROCHLORIDE 100 MG/1
TABLET, FILM COATED ORAL
COMMUNITY
Start: 2020-07-02 | End: 2020-07-09

## 2020-07-09 ASSESSMENT — ENCOUNTER SYMPTOMS
DIARRHEA: 0
NERVOUS/ANXIOUS: 0
ABDOMINAL PAIN: 0
DIZZINESS: 0
PARESTHESIAS: 0
FREQUENCY: 0
HEMATOCHEZIA: 0
DYSURIA: 0
MYALGIAS: 1
EYE PAIN: 0
JOINT SWELLING: 0
SORE THROAT: 0
BREAST MASS: 0
HEMATURIA: 0
NAUSEA: 0
CHILLS: 0
SHORTNESS OF BREATH: 0
COUGH: 0
FEVER: 0
HEADACHES: 0
CONSTIPATION: 0
ARTHRALGIAS: 0
WEAKNESS: 0
PALPITATIONS: 0
HEARTBURN: 1

## 2020-07-09 ASSESSMENT — ANXIETY QUESTIONNAIRES
1. FEELING NERVOUS, ANXIOUS, OR ON EDGE: SEVERAL DAYS
6. BECOMING EASILY ANNOYED OR IRRITABLE: NOT AT ALL
IF YOU CHECKED OFF ANY PROBLEMS ON THIS QUESTIONNAIRE, HOW DIFFICULT HAVE THESE PROBLEMS MADE IT FOR YOU TO DO YOUR WORK, TAKE CARE OF THINGS AT HOME, OR GET ALONG WITH OTHER PEOPLE: NOT DIFFICULT AT ALL
5. BEING SO RESTLESS THAT IT IS HARD TO SIT STILL: NOT AT ALL
GAD7 TOTAL SCORE: 2
2. NOT BEING ABLE TO STOP OR CONTROL WORRYING: NOT AT ALL
7. FEELING AFRAID AS IF SOMETHING AWFUL MIGHT HAPPEN: SEVERAL DAYS
3. WORRYING TOO MUCH ABOUT DIFFERENT THINGS: NOT AT ALL

## 2020-07-09 ASSESSMENT — PATIENT HEALTH QUESTIONNAIRE - PHQ9: 5. POOR APPETITE OR OVEREATING: NOT AT ALL

## 2020-07-09 NOTE — PROGRESS NOTES
Dr Rico's note    Patient's instructions / PLAN:                                                        Plan:  1. Continue Zoloft 100 mg daily         ASSESSMENT & PLAN:                                                      (Z00.00) Routine general medical examination at a health care facility  (primary encounter diagnosis)  Comment:   Plan: HPV High Risk Types DNA Cervical, Pap imaged         thin layer screen with HPV - recommended age 30        - 65 years (select HPV order below)            (F41.1) VIKRAM (generalized anxiety disorder)  Comment: Controlled    Plan: sertraline (ZOLOFT) 100 MG tablet               Chief Complaint:                                                        Annual exam  Follow up chronic medical problems      SUBJECTIVE:                                                    History of present illness     We reviewed the chronic medical problems as above.   I reviewed the recent tests results in Epic     Anxiety  -- used to take Pristiq and changed to Zoloft during pregnancy - by psychiatry ( closed the practice)   -- she would like me to take over the Rx    ROS:    See below        PMHx: - reviewed  Past Medical History:   Diagnosis Date     Allergic rhinitis, cause unspecified     sees allergist for allergy induced asthma.     ASCUS on Pap smear 3/15/11, 3/25/14, 8/13/18    + HPV < 20 yrs of age     ASTHMA UNSPECIFIED 7/27/2004     Frequent UTI      History of colposcopy with cervical biopsy 10/20/15, 2/10/16, 5/31/19    SAL 2, SAL I, SAL 1     Intermittent asthma      Intermittent asthma      LSIL (low grade squamous intraepithelial lesion) on Pap smear 5/10/12, 9/19/12, 5/15/15, 10/19/15, 2/10/16    HPV 66 High Risk     Moderate major depression (H)      psychiatrist: dr Patricia Reyes       PSHx: reviewed  Past Surgical History:   Procedure Laterality Date     COLPOSCOPY CERVIX, BIOPSY CERVIX, ENDOCERVICAL CURETTAGE, COMBINED  9/19/2012        Soc Hx: No daily alcohol, no smoking  Social  History     Socioeconomic History     Marital status:      Spouse name: Not on file     Number of children: Not on file     Years of education: Not on file     Highest education level: Not on file   Occupational History     Not on file   Social Needs     Financial resource strain: Not on file     Food insecurity     Worry: Not on file     Inability: Not on file     Transportation needs     Medical: Not on file     Non-medical: Not on file   Tobacco Use     Smoking status: Never Smoker     Smokeless tobacco: Never Used   Substance and Sexual Activity     Alcohol use: No     Drug use: No     Sexual activity: Yes     Partners: Male     Birth control/protection: Pill   Lifestyle     Physical activity     Days per week: Not on file     Minutes per session: Not on file     Stress: Not on file   Relationships     Social connections     Talks on phone: Not on file     Gets together: Not on file     Attends Rastafarian service: Not on file     Active member of club or organization: Not on file     Attends meetings of clubs or organizations: Not on file     Relationship status: Not on file     Intimate partner violence     Fear of current or ex partner: Not on file     Emotionally abused: Not on file     Physically abused: Not on file     Forced sexual activity: Not on file   Other Topics Concern     Parent/sibling w/ CABG, MI or angioplasty before 65F 55M? Not Asked   Social History Narrative     Not on file        Fam Hx: reviewed  Family History   Problem Relation Age of Onset     Cerebrovascular Disease Maternal Grandmother 60        smoker     Thyroid Disease Maternal Aunt         grave's dz     Asthma Mother      Asthma Father      Prostate Cancer Maternal Grandfather      Thyroid Disease Sister      Diabetes Other      Glaucoma No family hx of      Macular Degeneration No family hx of          Screening: reviewed      All: reviewed    Meds: reviewed  Current Outpatient Medications   Medication Sig Dispense Refill      albuterol 90 MCG/ACT inhaler Inhale 1-2 puffs into the lungs every 6 hours as needed for shortness of breath / dyspnea. 3 Inhaler 3     budesonide-formoterol (SYMBICORT) 80-4.5 MCG/ACT inhaler Inhale 2 puffs into the lungs 2 times daily 3 Inhaler 3     sertraline (ZOLOFT) 100 MG tablet          OBJECTIVE:                                                    Physical Exam :  not currently breastfeeding.     NAD, appears comfortable  Skin clear, no rashes  Neck: supple, no JVD,  no thyroidmegaly  Lymph nodes non palpable in the cervical, supraclavicular axillaries, inguinal areas  Chest: clear to auscultation with good respiratory effort  Cardiac: S1S2, RRR, no mgr appreciated  Abdomen: soft, not tender, not distended, audible bowel sound, no hepatosplenomegaly, no palpable masses, no abdominal bruits  Extremities: no cyanosis, clubbing or edema.   Neuro: A, Ox3, no focal signs.  Breast exam in supine and erect position: they are symmetrical, no skin changes, no tenderness or nodes on palpation. Nipples are erect, no skin lesions, no discharge on pressure.    Pelvic exam: Normal external genitals, normal appearing perineum, normal appearing urethra,  vaginal mucosa pink, no discharge, Cervix appears normal, Pap smear obtained. On bimanual exam, I did not feel any uterus or ovarian masses, and she denies any tenderness.         Shirley Rico MD  Internal Medicine        SUBJECTIVE:   CC: Althea Lopez is an 30 year old woman who presents for preventive health visit.     Healthy Habits:     Getting at least 3 servings of Calcium per day:  Yes    Bi-annual eye exam:  Yes    Dental care twice a year:  Yes    Sleep apnea or symptoms of sleep apnea:  None    Diet:  Gluten-free/reduced    Frequency of exercise:  2-3 days/week    Duration of exercise:  30-45 minutes    Taking medications regularly:  Yes    Medication side effects:  Not applicable    PHQ-2 Total Score: 0    Additional concerns today:   Yes    Ability to successfully perform activities of daily living: Yes, no assistance needed  Home safety:  none identified   Hearing impairment:No            Today's PHQ-2 Score:   PHQ-2 ( 1999 Pfizer) 7/8/2020   Q1: Little interest or pleasure in doing things 0   Q2: Feeling down, depressed or hopeless 0   PHQ-2 Score 0   Q1: Little interest or pleasure in doing things Not at all   Q2: Feeling down, depressed or hopeless Not at all   PHQ-2 Score 0       Abuse: Current or Past(Physical, Sexual or Emotional)- No  Do you feel safe in your environment? Yes        Social History     Tobacco Use     Smoking status: Never Smoker     Smokeless tobacco: Never Used   Substance Use Topics     Alcohol use: No         Alcohol Use 7/8/2020   Prescreen: >3 drinks/day or >7 drinks/week? No   Prescreen: >3 drinks/day or >7 drinks/week? -       Reviewed orders with patient.  Reviewed health maintenance and updated orders accordingly - Yes  Labs reviewed in EPIC        Pertinent mammograms are reviewed under the imaging tab.  History of abnormal Pap smear:   PAP / HPV Latest Ref Rng & Units 8/13/2018 6/2/2017 2/10/2016   PAP - ASC-US(A) NIL LSIL(A)   HPV 16 DNA NEG:Negative Negative Negative Negative   HPV 18 DNA NEG:Negative Negative Negative Negative   OTHER HR HPV NEG:Negative Negative Negative Positive(A)     Reviewed and updated as needed this visit by clinical staff         Reviewed and updated as needed this visit by Provider            Review of Systems   Constitutional: Negative for chills and fever.   HENT: Negative for congestion, ear pain, hearing loss and sore throat.    Eyes: Negative for pain and visual disturbance.   Respiratory: Negative for cough and shortness of breath.    Cardiovascular: Negative for chest pain, palpitations and peripheral edema.   Gastrointestinal: Positive for heartburn. Negative for abdominal pain, constipation, diarrhea, hematochezia and nausea.   Breasts:  Negative for tenderness, breast  "mass and discharge.   Genitourinary: Negative for dysuria, frequency, genital sores, hematuria, pelvic pain, urgency, vaginal bleeding and vaginal discharge.   Musculoskeletal: Positive for myalgias. Negative for arthralgias and joint swelling.   Skin: Negative for rash.   Neurological: Negative for dizziness, weakness, headaches and paresthesias.   Psychiatric/Behavioral: Negative for mood changes. The patient is not nervous/anxious.          COUNSELING:  Reviewed preventive health counseling, as reflected in patient instructions       Regular exercise       Healthy diet/nutrition    Estimated body mass index is 32.92 kg/m  as calculated from the following:    Height as of 5/31/19: 1.575 m (5' 2\").    Weight as of 5/31/19: 81.6 kg (180 lb).         reports that she has never smoked. She has never used smokeless tobacco.      Counseling Resources:  ATP IV Guidelines  Pooled Cohorts Equation Calculator  Breast Cancer Risk Calculator  FRAX Risk Assessment  ICSI Preventive Guidelines  Dietary Guidelines for Americans, 2010  USDA's MyPlate  ASA Prophylaxis  Lung CA Screening    Shirley Siu MD  [facility]  "

## 2020-07-10 ASSESSMENT — ASTHMA QUESTIONNAIRES: ACT_TOTALSCORE: 23

## 2020-07-10 ASSESSMENT — ANXIETY QUESTIONNAIRES: GAD7 TOTAL SCORE: 2

## 2020-07-13 LAB
COPATH REPORT: NORMAL
PAP: NORMAL

## 2020-07-15 LAB
FINAL DIAGNOSIS: ABNORMAL
HPV HR 12 DNA CVX QL NAA+PROBE: POSITIVE
HPV16 DNA SPEC QL NAA+PROBE: NEGATIVE
HPV18 DNA SPEC QL NAA+PROBE: NEGATIVE
SPECIMEN DESCRIPTION: ABNORMAL
SPECIMEN SOURCE CVX/VAG CYTO: ABNORMAL

## 2020-07-17 ENCOUNTER — PATIENT OUTREACH (OUTPATIENT)
Dept: INTERNAL MEDICINE | Facility: CLINIC | Age: 30
End: 2020-07-17

## 2020-07-17 DIAGNOSIS — N87.1 DYSPLASIA OF CERVIX, HIGH GRADE CIN 2: ICD-10-CM

## 2020-09-09 ENCOUNTER — PATIENT OUTREACH (OUTPATIENT)
Dept: INTERNAL MEDICINE | Facility: CLINIC | Age: 30
End: 2020-09-09

## 2020-09-09 DIAGNOSIS — N87.1 DYSPLASIA OF CERVIX, HIGH GRADE CIN 2: ICD-10-CM

## 2020-09-09 NOTE — TELEPHONE ENCOUNTER
Sofía reminder sent.   Chio Roberts -   [facility] Pap Tracking

## 2020-09-09 NOTE — LETTER
September 9, 2020      Althea Lopez  [address] [address] [place] [address]      Dear Ms. Terry Lopez,    At [facility], your health and wellness is our primary concern. That is why we are following up on a positive high risk HPV test  from 07/09/20.  Your Provider had recommended that you have a Colposcopy  completed by 10/09/20.    Please call the [facility] at  [phone] to schedule an appointment for a Colposcopy (this cannot be scheduled through [facility]) at your earliest convenience. If you have questions or concerns, please call the clinic and we will be happy to assist you.    If you have completed the tests outside of [facility], please have the results forwarded to our office. We will update the chart for your primary Physician to review before your next annual physical.     Thank you for choosing [facility]!    Sincerely,      Your [facility] Care Team//[facility]

## 2020-10-29 ENCOUNTER — OFFICE VISIT (OUTPATIENT)
Dept: OPTOMETRY | Facility: CLINIC | Age: 30
End: 2020-10-29
Payer: COMMERCIAL

## 2020-10-29 DIAGNOSIS — H52.203 MYOPIA OF BOTH EYES WITH ASTIGMATISM: Primary | ICD-10-CM

## 2020-10-29 DIAGNOSIS — H52.13 MYOPIA OF BOTH EYES WITH ASTIGMATISM: Primary | ICD-10-CM

## 2020-10-29 PROCEDURE — 92015 DETERMINE REFRACTIVE STATE: CPT | Performed by: OPTOMETRIST

## 2020-10-29 PROCEDURE — 92310 CONTACT LENS FITTING OU: CPT | Mod: GA | Performed by: OPTOMETRIST

## 2020-10-29 PROCEDURE — 92004 COMPRE OPH EXAM NEW PT 1/>: CPT | Performed by: OPTOMETRIST

## 2020-10-29 ASSESSMENT — TONOMETRY
IOP_METHOD: APPLANATION
OD_IOP_MMHG: 16
OS_IOP_MMHG: 16

## 2020-10-29 ASSESSMENT — REFRACTION_MANIFEST
OD_SPHERE: -4.50
OS_CYLINDER: +1.00
OS_SPHERE: -5.50
OD_CYLINDER: +0.50
OS_AXIS: 108
OD_AXIS: 075

## 2020-10-29 ASSESSMENT — REFRACTION_CURRENTRX
OD_BASECURVE: 8.7
OS_BASECURVE: 8.7
OD_BRAND: ALCON DAILIES TOTAL 1 BC 8.5, D 14.1
OS_DIAMETER: 14.0
OS_BRAND: ALCON DAILIES TOTAL 1 BC 8.5, D 14.1
OD_SPHERE: -4.00
OS_SPHERE: -4.25
OD_DIAMETER: 14.0

## 2020-10-29 ASSESSMENT — VISUAL ACUITY
METHOD: SNELLEN - LINEAR
OS_CC: 20/20-1
CORRECTION_TYPE: CONTACTS
OS_CC+: -1
OD_CC+: -1
OS_CC: 20/25
OD_CC: 20/20
OD_CC: 20/20-1

## 2020-10-29 ASSESSMENT — CONF VISUAL FIELD
OS_NORMAL: 1
METHOD: COUNTING FINGERS
OD_NORMAL: 1

## 2020-10-29 ASSESSMENT — CUP TO DISC RATIO
OS_RATIO: 0.4
OD_RATIO: 0.35

## 2020-10-29 ASSESSMENT — SLIT LAMP EXAM - LIDS
COMMENTS: NORMAL
COMMENTS: NORMAL

## 2020-10-29 ASSESSMENT — EXTERNAL EXAM - RIGHT EYE: OD_EXAM: NORMAL

## 2020-10-29 ASSESSMENT — EXTERNAL EXAM - LEFT EYE: OS_EXAM: NORMAL

## 2020-10-29 NOTE — PROGRESS NOTES
Chief Complaint   Patient presents with     Annual Eye Exam     Contact Lens Evaluation     Feels like astigmatism is worsening. Doesn't mind not having in contacts as she tried toric before and did not like as much as this lens so would like to stay in it    Previous contact lens wearer? Yes: Yissel total one  Comfort of contact lenses : Good  Satisfied with current lenses: Yes        Last Eye Exam: 2018  Dilated Previously: Yes    What are you currently using to see?  glasses and contacts    Distance Vision Acuity: Noticed gradual change in both eyes    Near Vision Acuity: Satisfied with vision while reading and using computer with cls    Eye Comfort: Dry with allergy meds and winter  Do you use eye drops? : Yes: Rewetting OTC AT's. Using this maybe a couple times a month.  Occupation or Hobbies: Construction        Karissa Fournier CPO     Medical, surgical and family histories reviewed and updated 10/29/2020.       OBJECTIVE: See Ophthalmology exam    ASSESSMENT:    ICD-10-CM    1. Myopia of both eyes with astigmatism  H52.13 REFRACTION    H52.203 CONTACT LENS FITTING,BILAT w/ signed waiver      PLAN:   Dispensed trials w/ prescription for glasses and contacts       Carmen Kamara OD

## 2020-10-29 NOTE — LETTER
10/29/2020         RE: Althea Lopez  [address] [place] [address]        Dear Colleague,    [facility] Please see a copy of my visit note below.    Chief Complaint   Patient presents with     Annual Eye Exam     Contact Lens Evaluation     Feels like astigmatism is worsening. Doesn't mind not having in contacts as she tried toric before and did not like as much as this lens so would like to stay in it    Previous contact lens wearer? Yes: Carrieliamparo total one  Comfort of contact lenses : Good  Satisfied with current lenses: Yes        Last Eye Exam: 2018  Dilated Previously: Yes    What are you currently using to see?  glasses and contacts    Distance Vision Acuity: Noticed gradual change in both eyes    Near Vision Acuity: Satisfied with vision while reading and using computer with cls    Eye Comfort: Dry with allergy meds and winter  Do you use eye drops? : Yes: Rewetting OTC AT's. Using this maybe a couple times a month.  Occupation or Hobbies: Construction        Karissa Fournier CPO     Medical, surgical and family histories reviewed and updated 10/29/2020.       OBJECTIVE: See Ophthalmology exam    ASSESSMENT:    ICD-10-CM    1. Myopia of both eyes with astigmatism  H52.13 REFRACTION    H52.203 CONTACT LENS FITTING,BILAT w/ signed waiver      PLAN:   Dispensed trials w/ prescription for glasses and contacts       Carmen Kamara OD       Again, thank you for allowing me to participate in the care of your patient.        Sincerely,        Carmen Kamara, OD

## 2020-12-30 ENCOUNTER — PATIENT OUTREACH (OUTPATIENT)
Dept: INTERNAL MEDICINE | Facility: CLINIC | Age: 30
End: 2020-12-30

## 2020-12-30 DIAGNOSIS — N87.1 DYSPLASIA OF CERVIX, HIGH GRADE CIN 2: ICD-10-CM

## 2021-01-15 ENCOUNTER — HEALTH MAINTENANCE LETTER (OUTPATIENT)
Age: 31
End: 2021-01-15

## 2021-02-22 ENCOUNTER — MYC MEDICAL ADVICE (OUTPATIENT)
Dept: OPTOMETRY | Facility: CLINIC | Age: 31
End: 2021-02-22

## 2021-02-26 ENCOUNTER — TRANSFERRED RECORDS (OUTPATIENT)
Dept: HEALTH INFORMATION MANAGEMENT | Facility: CLINIC | Age: 31
End: 2021-02-26

## 2021-02-26 NOTE — TELEPHONE ENCOUNTER
FYI to provider - Patient is lost to pap tracking follow-up. Attempts to contact pt have been made per reminder process and there has been no reply and/or no appt scheduled.

## 2021-03-14 ENCOUNTER — HEALTH MAINTENANCE LETTER (OUTPATIENT)
Age: 31
End: 2021-03-14

## 2021-04-14 ENCOUNTER — OFFICE VISIT (OUTPATIENT)
Dept: NURSING | Facility: CLINIC | Age: 31
End: 2021-04-14
Payer: COMMERCIAL

## 2021-04-14 PROCEDURE — 0001A PR COVID VAC PFIZER DIL RECON 30 MCG/0.3 ML IM: CPT

## 2021-04-14 PROCEDURE — 91300 PR COVID VAC PFIZER DIL RECON 30 MCG/0.3 ML IM: CPT

## 2021-05-05 ENCOUNTER — IMMUNIZATION (OUTPATIENT)
Dept: NURSING | Facility: CLINIC | Age: 31
End: 2021-05-05
Attending: INTERNAL MEDICINE
Payer: COMMERCIAL

## 2021-05-05 PROCEDURE — 91300 PR COVID VAC PFIZER DIL RECON 30 MCG/0.3 ML IM: CPT

## 2021-05-05 PROCEDURE — 0002A PR COVID VAC PFIZER DIL RECON 30 MCG/0.3 ML IM: CPT

## 2021-06-06 ENCOUNTER — E-VISIT (OUTPATIENT)
Dept: URGENT CARE | Facility: CLINIC | Age: 31
End: 2021-06-06
Payer: COMMERCIAL

## 2021-06-06 DIAGNOSIS — N39.0 ACUTE UTI (URINARY TRACT INFECTION): Primary | ICD-10-CM

## 2021-06-06 PROCEDURE — 99421 OL DIG E/M SVC 5-10 MIN: CPT | Performed by: NURSE PRACTITIONER

## 2021-06-06 RX ORDER — NITROFURANTOIN 25; 75 MG/1; MG/1
100 CAPSULE ORAL 2 TIMES DAILY
Qty: 10 CAPSULE | Refills: 0 | Status: SHIPPED | OUTPATIENT
Start: 2021-06-06 | End: 2021-06-11

## 2021-06-07 NOTE — PATIENT INSTRUCTIONS
Dear Althea Lopez    After reviewing your responses, I've been able to diagnose you with a urinary tract infection, which is a common infection of the bladder with bacteria.  This is not a sexually transmitted infection, though urinating immediately after intercourse can help prevent infections.  Drinking lots of fluids is also helpful to clear your current infection and prevent the next one.      I have sent a prescription for antibiotics to your pharmacy to treat this infection.    It is important that you take all of your prescribed medication even if your symptoms are improving after a few doses.  Taking all of your medicine helps prevent the symptoms from returning.     If your symptoms worsen, you develop pain in your back or stomach, develop fevers, or are not improving in 5 days, please contact your primary care provider for an appointment or visit any of our convenient Walk-in or Urgent Care Centers to be seen, which can be found on our website here.    Thanks again for choosing us as your health care partner,    Yudith Mckeon, CNP

## 2021-07-26 ENCOUNTER — TRANSFERRED RECORDS (OUTPATIENT)
Dept: HEALTH INFORMATION MANAGEMENT | Facility: CLINIC | Age: 31
End: 2021-07-26

## 2021-08-29 ENCOUNTER — HEALTH MAINTENANCE LETTER (OUTPATIENT)
Age: 31
End: 2021-08-29

## 2021-10-24 ENCOUNTER — HEALTH MAINTENANCE LETTER (OUTPATIENT)
Age: 31
End: 2021-10-24

## 2022-01-02 ENCOUNTER — E-VISIT (OUTPATIENT)
Dept: INTERNAL MEDICINE | Facility: CLINIC | Age: 32
End: 2022-01-02
Payer: COMMERCIAL

## 2022-01-02 DIAGNOSIS — N76.0 BACTERIAL VAGINOSIS: ICD-10-CM

## 2022-01-02 DIAGNOSIS — B96.89 BACTERIAL VAGINOSIS: ICD-10-CM

## 2022-01-02 DIAGNOSIS — N94.9 VAGINAL DISCOMFORT: Primary | ICD-10-CM

## 2022-01-02 PROCEDURE — 99421 OL DIG E/M SVC 5-10 MIN: CPT | Performed by: INTERNAL MEDICINE

## 2022-01-03 ENCOUNTER — APPOINTMENT (OUTPATIENT)
Dept: LAB | Facility: CLINIC | Age: 32
End: 2022-01-03
Payer: COMMERCIAL

## 2022-01-03 ENCOUNTER — LAB (OUTPATIENT)
Dept: LAB | Facility: CLINIC | Age: 32
End: 2022-01-03
Payer: COMMERCIAL

## 2022-01-03 DIAGNOSIS — N94.9 VAGINAL DISCOMFORT: ICD-10-CM

## 2022-01-03 LAB
ALBUMIN UR-MCNC: NEGATIVE MG/DL
APPEARANCE UR: CLEAR
BILIRUB UR QL STRIP: NEGATIVE
CLUE CELLS: PRESENT
COLOR UR AUTO: NORMAL
GLUCOSE UR STRIP-MCNC: NEGATIVE MG/DL
HGB UR QL STRIP: NEGATIVE
KETONES UR STRIP-MCNC: NEGATIVE MG/DL
LEUKOCYTE ESTERASE UR QL STRIP: NEGATIVE
NITRATE UR QL: NEGATIVE
PH UR STRIP: 6.5 [PH] (ref 5–7)
RBC URINE: 1 /HPF
SP GR UR STRIP: 1 (ref 1–1.03)
SQUAMOUS EPITHELIAL: 1 /HPF
TRICHOMONAS, WET PREP: ABNORMAL
UROBILINOGEN UR STRIP-MCNC: NORMAL MG/DL
WBC URINE: 0 /HPF
WBC'S/HIGH POWER FIELD, WET PREP: ABNORMAL
YEAST, WET PREP: ABNORMAL

## 2022-01-03 PROCEDURE — 87210 SMEAR WET MOUNT SALINE/INK: CPT

## 2022-01-03 PROCEDURE — 81001 URINALYSIS AUTO W/SCOPE: CPT

## 2022-01-04 RX ORDER — METRONIDAZOLE 500 MG/1
500 TABLET ORAL 2 TIMES DAILY
Qty: 14 TABLET | Refills: 0 | Status: SHIPPED | OUTPATIENT
Start: 2022-01-04 | End: 2022-01-11

## 2022-02-12 ENCOUNTER — APPOINTMENT (OUTPATIENT)
Dept: GENERAL RADIOLOGY | Facility: CLINIC | Age: 32
End: 2022-02-12
Attending: EMERGENCY MEDICINE
Payer: COMMERCIAL

## 2022-02-12 ENCOUNTER — HOSPITAL ENCOUNTER (EMERGENCY)
Facility: CLINIC | Age: 32
Discharge: HOME | End: 2022-02-12
Attending: EMERGENCY MEDICINE | Admitting: EMERGENCY MEDICINE
Payer: COMMERCIAL

## 2022-02-12 VITALS
RESPIRATION RATE: 18 BRPM | SYSTOLIC BLOOD PRESSURE: 124 MMHG | HEIGHT: 62 IN | TEMPERATURE: 97.4 F | WEIGHT: 160 LBS | BODY MASS INDEX: 29.44 KG/M2 | DIASTOLIC BLOOD PRESSURE: 82 MMHG | HEART RATE: 72 BPM | OXYGEN SATURATION: 98 %

## 2022-02-12 DIAGNOSIS — S42.034A CLOSED NONDISPLACED FRACTURE OF ACROMIAL END OF RIGHT CLAVICLE, INITIAL ENCOUNTER: ICD-10-CM

## 2022-02-12 PROCEDURE — 73000 X-RAY EXAM OF COLLAR BONE: CPT | Mod: RT

## 2022-02-12 PROCEDURE — 250N000013 HC RX MED GY IP 250 OP 250 PS 637: Performed by: EMERGENCY MEDICINE

## 2022-02-12 PROCEDURE — 99283 EMERGENCY DEPT VISIT LOW MDM: CPT

## 2022-02-12 RX ORDER — OXYCODONE HYDROCHLORIDE 5 MG/1
5 TABLET ORAL EVERY 6 HOURS PRN
Qty: 6 TABLET | Refills: 0 | Status: SHIPPED | OUTPATIENT
Start: 2022-02-12 | End: 2022-02-15

## 2022-02-12 RX ORDER — ACETAMINOPHEN 325 MG/1
975 TABLET ORAL ONCE
Status: COMPLETED | OUTPATIENT
Start: 2022-02-12 | End: 2022-02-12

## 2022-02-12 RX ORDER — HYDROMORPHONE HYDROCHLORIDE 1 MG/ML
0.5 INJECTION, SOLUTION INTRAMUSCULAR; INTRAVENOUS; SUBCUTANEOUS
Status: DISCONTINUED | OUTPATIENT
Start: 2022-02-12 | End: 2022-02-12

## 2022-02-12 RX ORDER — OXYCODONE HYDROCHLORIDE 5 MG/1
5 TABLET ORAL ONCE
Status: DISCONTINUED | OUTPATIENT
Start: 2022-02-12 | End: 2022-02-12 | Stop reason: HOSPADM

## 2022-02-12 RX ADMIN — ACETAMINOPHEN 975 MG: 325 TABLET, FILM COATED ORAL at 15:46

## 2022-02-12 ASSESSMENT — ENCOUNTER SYMPTOMS
ARTHRALGIAS: 1
HEADACHES: 0

## 2022-02-12 ASSESSMENT — MIFFLIN-ST. JEOR: SCORE: 1394.01

## 2022-02-12 NOTE — ED PROVIDER NOTES
"  History   Chief Complaint:  Shoulder Injury    The history is provided by the patient and medical records.      Althea Lopez is a 31 year old female with history of asthma who presents with a right shoulder injury. The patient states she was playing ice hockey when she tripped and fell forward, landing on her right shoulder. She had a helmet up and denies loss of consciousness. No history of prior injuries to the right shoulder. Pain with range of motion.     Review of Systems   Musculoskeletal: Positive for arthralgias (right shoulder).   Neurological: Negative for syncope and headaches.   All other systems reviewed and are negative.    Allergies:  The patient has no known allergies.     Medications:  Albuterol inhaler  Symbicort inhaler  Zoloft    Past Medical History:     Asthma  Depression      Past Surgical History:     Endocervical curettage      Family History:    Mother - Asthma  Father - Asthma  Sister - Thyroid Disease    Social History:  The patient was unaccompanied to the emergency department.    Physical Exam     Patient Vitals for the past 24 hrs:   BP Temp Temp src Pulse Resp SpO2 Height Weight   02/12/22 1522 124/82 97.4  F (36.3  C) Oral 72 18 98 % 1.575 m (5' 2\") 72.6 kg (160 lb)     Physical Exam  Constitutional: Alert, attentive, GCS 15   HENT:    Mouth/Throat: Mucous membranes are moist.    Neck: No midline tenderness. Full range of motion.   Eyes: EOM are normal, anicteric, conjugate gaze  CV: distal extremities warm, well perfused  Chest: Non-labored breathing on RA  GI:  non tender. No distension. No guarding or rebound.    MSK: Mid clavicle tenderness. No skin tenting. No AC tenderness. No upper arm or elbow tenderness.   Neurological: Alert, attentive, moving all extremities equally.   Skin: Skin is warm and dry.    Emergency Department Course   Imaging:  XR Clavicle Right   Final Result   IMPRESSION: Fracture of the middle third of the right clavicle. The right " glenohumeral joint is negative.       Report per radiology     Emergency Department Course:    Reviewed:  I reviewed nursing notes, vitals, past medical history and Care Everywhere    Assessments:  1546 I obtained history and examined the patient as noted above. I explained x-ray findings.     Interventions:  1546 Tylenol 975 mg PO    Disposition:  The patient was discharged to home.     Impression & Plan   Medical Decision Making:  Althea Lopez is a 31 year old female who presents for evaluation of right shoulder pain after a mechanical fall while playing ice hockey. CMS is intact distally in the extremity.  Xrays reveal a clavicle fracture.  No signs of shoulder dislocation, distal nerve compromise, sternal head dislocation or open fracture. They understand that this need for reduction and/or surgery may change with time and orthopedic consultation.  There is no indication for ortho consultation from the ED. Rather, close follow-up is indicated in the next days.  Splint and fracture precautions for home.  The patients head to toe trauma exam is otherwise normal at this time and no further trauma workup is needed as I believe there is no signs of serious head, neck, chest, spinal, extremity or abdominal injuries.      Diagnosis:    ICD-10-CM    1. Closed nondisplaced fracture of acromial end of right clavicle, initial encounter  S42.034A      Discharge Medications:  New Prescriptions    OXYCODONE (ROXICODONE) 5 MG TABLET    Take 1 tablet (5 mg) by mouth every 6 hours as needed for severe pain     Nicolas Maciel MD  [employer]  4:31 PM 02/12/22     Scribe Disclosure:  I, Judy Hawkins, am serving as a scribe at 3:28 PM on 2/12/2022 to document services personally performed by Nicolas Maciel MD based on my observations and the provider's statements to me.     Nicolas Maciel MD  02/12/22 4374

## 2022-02-15 ENCOUNTER — TRANSFERRED RECORDS (OUTPATIENT)
Dept: HEALTH INFORMATION MANAGEMENT | Facility: CLINIC | Age: 32
End: 2022-02-15
Payer: COMMERCIAL

## 2022-02-24 ENCOUNTER — TRANSFERRED RECORDS (OUTPATIENT)
Dept: HEALTH INFORMATION MANAGEMENT | Facility: CLINIC | Age: 32
End: 2022-02-24
Payer: COMMERCIAL

## 2022-02-25 ENCOUNTER — TRANSFERRED RECORDS (OUTPATIENT)
Dept: HEALTH INFORMATION MANAGEMENT | Facility: CLINIC | Age: 32
End: 2022-02-25
Payer: COMMERCIAL

## 2022-03-03 ENCOUNTER — TRANSFERRED RECORDS (OUTPATIENT)
Dept: HEALTH INFORMATION MANAGEMENT | Facility: CLINIC | Age: 32
End: 2022-03-03
Payer: COMMERCIAL

## 2022-04-07 ENCOUNTER — TRANSFERRED RECORDS (OUTPATIENT)
Dept: HEALTH INFORMATION MANAGEMENT | Facility: CLINIC | Age: 32
End: 2022-04-07
Payer: COMMERCIAL

## 2022-05-25 ENCOUNTER — E-VISIT (OUTPATIENT)
Dept: FAMILY MEDICINE | Facility: CLINIC | Age: 32
End: 2022-05-25
Payer: COMMERCIAL

## 2022-05-25 DIAGNOSIS — J06.9 VIRAL URI: Primary | ICD-10-CM

## 2022-05-25 PROCEDURE — 99421 OL DIG E/M SVC 5-10 MIN: CPT | Performed by: PHYSICIAN ASSISTANT

## 2022-05-25 NOTE — PATIENT INSTRUCTIONS
Viral Upper Respiratory Illness (Adult)    You have a viral upper respiratory illness (URI), which is another term for the common cold. This illness is contagious during the first few days. It is spread through the air by coughing and sneezing. It may also be spread by direct contact (touching the sick person and then touching your own eyes, nose, or mouth). Frequent handwashing will decrease risk of spread. Most viral illnesses go away within 7 to 10 days with rest and simple home remedies. Sometimes the illness may last for several weeks. Antibiotics will not kill a virus, and they are generally not prescribed for this condition.  Home care    If symptoms are severe, rest at home for the first 2 to 3 days. When you resume activity, don't let yourself get too tired.    Don't smoke. If you need help stopping, talk with your healthcare provider.    Avoid being exposed to cigarette smoke (yours or others ).    You may use acetaminophen or ibuprofen to control pain and fever, unless another medicine was prescribed. If you have chronic liver or kidney disease, have ever had a stomach ulcer or gastrointestinal bleeding, or are taking blood-thinning medicines, talk with your healthcare provider before using these medicines. Aspirin should never be given to anyone under 18 years of age who is ill with a viral infection or fever. It may cause severe liver or brain damage.    Your appetite may be poor, so a light diet is fine. Stay well hydrated by drinking 6 to 8 glasses of fluids per day (water, soft drinks, juices, tea, or soup). Extra fluids will help loosen secretions in the nose and lungs.    Over-the-counter cold medicines will not shorten the length of time you re sick, but they may be helpful for the following symptoms: cough, sore throat, and nasal and sinus congestion. If you take prescription medicines, ask your healthcare provider or pharmacist which over-the-counter medicines are safe to use. (Note: Don't  use decongestants if you have high blood pressure.)  Follow-up care  Follow up with your healthcare provider, or as advised.  When to seek medical advice  Call your healthcare provider right away if any of these occur:    Cough with lots of colored sputum (mucus)    Severe headache; face, neck, or ear pain    Difficulty swallowing due to throat pain    Fever of 100.4 F (38 C) or higher, or as directed by your healthcare provider  Call 911  Call 911 if any of these occur:    Chest pain, shortness of breath, wheezing, or difficulty breathing    Coughing up blood    Very severe pain with swallowing, especially if it goes along with a muffled voice   [employer] last reviewed this educational content on 6/1/2018 2000-2021 [employer]. All rights reserved. This information is not intended as a substitute for professional medical care. Always follow your healthcare professional's instructions.        Deamaría elena Lopez    After reviewing your responses, I've been able to diagnose you with?a sinus infection caused by a virus.? ?     It is also important to stay well hydrated, get lots of rest and take over-the-counter decongestants,?tylenol?or ibuprofen if you?are able to?take those medications per your primary care provider to help relieve discomfort.?     It is important that you take?all of?your prescribed medication even if your symptoms are improving after a few doses.? Taking?all of?your medicine helps prevent the symptoms from returning.?     If your symptoms worsen, you develop severe headache, vomiting, high fever (>102), or are not improving in 7 days, please contact your primary care provider for an appointment or visit any of our convenient Walk-in Care or Urgent Care Centers to be seen which can be found on our website?here.?     Thanks again for choosing?us?as your health care partner,?   ?  Quincy Lockhart PA-C?   Dear Althea,      Based on your responses, you may have  coronavirus (COVID-19).     Will I be tested for COVID-19?  We would like to test you for COVID. I have placed orders for this test.     To schedule: go to your [employer] home page and scroll down to the section that says  You have an appointment that needs to be scheduled  and click the large green button that says  Schedule Now  and follow the steps to find the next available openings.    If you are unable to complete these [employer] scheduling steps, please call [phone] to schedule your testing.     These guidelines are for isolating before returning to work, school or .     For employers, schools and day cares: This is an official notice for this person s medical guidelines for returning in-person.     For health care sites: The CDC gives different isolation and quarantine guidelines for healthcare sites, please check with these sites before arriving.     How do I self-isolate?  You isolate when you have symptoms of COVID or a test shows you have COVID, even if you don t have symptoms.     If you DO have symptoms:  o Stay home and away from others  - For at least 5 days after your symptoms started, AND   - You are fever free for 24 hours (with no medicine that reduces fever), AND  - Your other symptoms are better.  o Wear a mask for 10 full days any time you are around others.    If you DON T have symptoms:  o Stay at home and away from others for at least 5 days after your positive test.  o Wear a mask for 10 full days any time you are around others.    How can I take care of myself?  Over the counter medications may help with your symptoms such as runny or stuffy nose, cough, chills, or fever.  Talk to your care team about your options.     Some people are at high risk of severe illness (for example, you have a weak immune system, you re 65 years or older, or you have certain medical problems). If your risk is high and your symptoms started in the last 5 to 7 days, we strongly recommend for you to get  COVID treatment as soon as possible. Paxlovid, Molnupiravir and the monoclonal antibody treatments are proven safe and effective, make you feel better faster, and prevent hospitalization and death.       To schedule an appointment to discuss COVID treatment, request an appointment on MyChart (select  COVID-19 Treatment ) or call AubrieDEANNA ([phone]), press 7.      Get lots of rest. Drink extra fluids (unless a doctor has told you not to)    Take Tylenol (acetaminophen) or ibuprofen for fever or pain. If you have liver or kidney problems, ask your family doctor if it's okay to take Tylenol or ibuprofen    Take over the counter medications for your symptoms, as directed by your doctor. You may also talk to your pharmacist.      If you have other health problems (like cancer, heart failure, an organ transplant or severe kidney disease): Call your specialty clinic if you don't feel better in the next 2 days.    Know when to call 911. Emergency warning signs include:  o Trouble breathing or shortness of breath  o Pain or pressure in the chest that doesn't go away  o Feeling confused like you haven't felt before, or not being able to wake up  o Bluish-colored lips or face    Where can I get more information?    [facility] - About COVID-19: [email or web address]     CDC - What to Do If You're Sick: https://www.cdc.gov/coronavirus/2019-ncov/if-you-are-sick/index.html     CDC - Quarantine & Isolation: https://www.cdc.gov/coronavirus/2019-ncov/your-health/quarantine-isolation.html     [facility] clinical trials (COVID-19 research studies): [email or web address]-clinical-trials    Below are the COVID-19 hotlines at the [facility] of Health ([facility]). Interpreters are available.  o For health questions: Call 961-838-2857 or 1-468.335.4114 (7 a.m. to 7 p.m.)  o For questions about schools and childcare: Call 380-002-2581 or 1-199.203.2177 (7 a.m. to 7 p.m.)

## 2022-08-03 ENCOUNTER — OFFICE VISIT (OUTPATIENT)
Dept: INTERNAL MEDICINE | Facility: CLINIC | Age: 32
End: 2022-08-03
Payer: COMMERCIAL

## 2022-08-03 VITALS
WEIGHT: 177.8 LBS | OXYGEN SATURATION: 99 % | HEART RATE: 69 BPM | HEIGHT: 62 IN | TEMPERATURE: 97.9 F | RESPIRATION RATE: 18 BRPM | DIASTOLIC BLOOD PRESSURE: 76 MMHG | SYSTOLIC BLOOD PRESSURE: 117 MMHG | BODY MASS INDEX: 32.72 KG/M2

## 2022-08-03 DIAGNOSIS — Z00.00 ROUTINE GENERAL MEDICAL EXAMINATION AT A HEALTH CARE FACILITY: Primary | ICD-10-CM

## 2022-08-03 DIAGNOSIS — J45.30 MILD PERSISTENT ASTHMA WITHOUT COMPLICATION: ICD-10-CM

## 2022-08-03 DIAGNOSIS — Z11.59 NEED FOR HEPATITIS C SCREENING TEST: ICD-10-CM

## 2022-08-03 DIAGNOSIS — Z11.4 SCREENING FOR HIV (HUMAN IMMUNODEFICIENCY VIRUS): ICD-10-CM

## 2022-08-03 DIAGNOSIS — Z12.4 CERVICAL CANCER SCREENING: ICD-10-CM

## 2022-08-03 PROCEDURE — 87491 CHLMYD TRACH DNA AMP PROBE: CPT | Performed by: INTERNAL MEDICINE

## 2022-08-03 PROCEDURE — 99395 PREV VISIT EST AGE 18-39: CPT | Performed by: INTERNAL MEDICINE

## 2022-08-03 PROCEDURE — 87624 HPV HI-RISK TYP POOLED RSLT: CPT | Performed by: INTERNAL MEDICINE

## 2022-08-03 PROCEDURE — G0145 SCR C/V CYTO,THINLAYER,RESCR: HCPCS | Performed by: INTERNAL MEDICINE

## 2022-08-03 PROCEDURE — 87591 N.GONORRHOEAE DNA AMP PROB: CPT | Performed by: INTERNAL MEDICINE

## 2022-08-03 RX ORDER — METHYLPHENIDATE HYDROCHLORIDE 18 MG/1
TABLET ORAL
COMMUNITY

## 2022-08-03 RX ORDER — FLUTICASONE PROPIONATE AND SALMETEROL 250; 50 UG/1; UG/1
1 POWDER RESPIRATORY (INHALATION) EVERY 12 HOURS
Qty: 180 EACH | Refills: 4 | Status: SHIPPED | OUTPATIENT
Start: 2022-08-03

## 2022-08-03 RX ORDER — ALBUTEROL SULFATE 90 UG/1
2 AEROSOL, METERED RESPIRATORY (INHALATION) EVERY 6 HOURS
Qty: 18 G | Refills: 3 | Status: SHIPPED | OUTPATIENT
Start: 2022-08-03 | End: 2024-02-06

## 2022-08-03 ASSESSMENT — ENCOUNTER SYMPTOMS
COUGH: 0
ARTHRALGIAS: 0
SHORTNESS OF BREATH: 0
NERVOUS/ANXIOUS: 0
FEVER: 0
SORE THROAT: 0
MYALGIAS: 0
HEMATOCHEZIA: 0
PARESTHESIAS: 0
EYE PAIN: 0
ABDOMINAL PAIN: 0
PALPITATIONS: 0
CHILLS: 0
DYSURIA: 0
CONSTIPATION: 0
HEADACHES: 1
HEMATURIA: 0
DIZZINESS: 0
JOINT SWELLING: 0
WEAKNESS: 0
HEARTBURN: 1
NAUSEA: 0
FREQUENCY: 0
DIARRHEA: 0

## 2022-08-03 ASSESSMENT — ASTHMA QUESTIONNAIRES
QUESTION_3 LAST FOUR WEEKS HOW OFTEN DID YOUR ASTHMA SYMPTOMS (WHEEZING, COUGHING, SHORTNESS OF BREATH, CHEST TIGHTNESS OR PAIN) WAKE YOU UP AT NIGHT OR EARLIER THAN USUAL IN THE MORNING: NOT AT ALL
ACT_TOTALSCORE: 24
QUESTION_5 LAST FOUR WEEKS HOW WOULD YOU RATE YOUR ASTHMA CONTROL: COMPLETELY CONTROLLED
QUESTION_2 LAST FOUR WEEKS HOW OFTEN HAVE YOU HAD SHORTNESS OF BREATH: NOT AT ALL
ACT_TOTALSCORE: 24
QUESTION_4 LAST FOUR WEEKS HOW OFTEN HAVE YOU USED YOUR RESCUE INHALER OR NEBULIZER MEDICATION (SUCH AS ALBUTEROL): ONCE A WEEK OR LESS
QUESTION_1 LAST FOUR WEEKS HOW MUCH OF THE TIME DID YOUR ASTHMA KEEP YOU FROM GETTING AS MUCH DONE AT WORK, SCHOOL OR AT HOME: NONE OF THE TIME

## 2022-08-03 NOTE — PROGRESS NOTES
Dr Rico's note    Patient's instructions / PLAN:                                                        Plan:  1. Please call/write if you take a different Advair dose than 250/50      ASSESSMENT & PLAN:                                                      (Z00.00) Routine general medical examination at a health care facility  (primary encounter diagnosis)  Comment:   Plan:     (J45.30) Mild persistent asthma without complication  Comment: Controlled    Plan: fluticasone-salmeterol (ADVAIR) 250-50 MCG/ACT         inhaler, albuterol (PROAIR HFA/PROVENTIL         HFA/VENTOLIN HFA) 108 (90 Base) MCG/ACT inhaler            (Z12.4) Cervical cancer screening  Comment:   Plan:        Chief Complaint:                                                        Annual exam  Follow up chronic medical problems      SUBJECTIVE:                                                    History of present illness     We reviewed the chronic medical problems as above.   I reviewed the recent tests [facility] in [facility]       Asthma--- controlled  -- the asthma doctor is not in her network anymore.   -- asthma is controlled  -- I refilled the meds     ROS:     See below          PMHx: - reviewed  Past Medical History:   Diagnosis Date     Allergic rhinitis, cause unspecified     sees allergist for allergy induced asthma.     ASCUS on Pap smear 3/15/11, 3/25/14, 8/13/18    + HPV < 20 yrs of age     ASTHMA UNSPECIFIED 7/27/2004     Frequent UTI      History of colposcopy with cervical biopsy 10/20/15, 2/10/16, 5/31/19    SAL 2, SAL I, SAL 1     Intermittent asthma      Intermittent asthma      LSIL (low grade squamous intraepithelial lesion) on Pap smear 5/10/12, 9/19/12, 5/15/15, 10/19/15, 2/10/16    HPV 66 High Risk     Moderate major depression (H)      psychiatrist: dr Patricia Reyes       PSHx: reviewed  Past Surgical History:   Procedure Laterality Date     COLPOSCOPY CERVIX, BIOPSY CERVIX, ENDOCERVICAL CURETTAGE, COMBINED  9/19/2012        Soc  Hx: No daily alcohol, no smoking  Social History     Socioeconomic History     Marital status:      Spouse name: Not on file     Number of children: Not on file     Years of education: Not on file     Highest education level: Not on file   Occupational History     Not on file   Tobacco Use     Smoking status: Never Smoker     Smokeless tobacco: Never Used   Vaping Use     Vaping Use: Never used   Substance and Sexual Activity     Alcohol use: No     Drug use: No     Sexual activity: Yes     Partners: Male     Birth control/protection: Pill   Other Topics Concern     Parent/sibling w/ CABG, MI or angioplasty before 65F 55M? Not Asked   Social History Narrative     Not on file     Social Determinants of Health     Financial Resource Strain: Not on file   Food Insecurity: Not on file   Transportation Needs: Not on file   Physical Activity: Not on file   Stress: Not on file   Social Connections: Not on file   Intimate Partner Violence: Not on file   Housing Stability: Not on file        Fam Hx: reviewed  Family History   Problem Relation Age of Onset     Cerebrovascular Disease Maternal Grandmother 60        smoker     Glaucoma Maternal Grandmother      Thyroid Disease Maternal Aunt         grave's dz     Asthma Mother      Asthma Father      Prostate Cancer Maternal Grandfather      Macular Degeneration Paternal Grandfather      Thyroid Disease Sister      Diabetes Other          Screening: reviewed      All: reviewed    Meds: reviewed  Current Outpatient Medications   Medication Sig Dispense Refill     albuterol (PROAIR HFA/PROVENTIL HFA/VENTOLIN HFA) 108 (90 Base) MCG/ACT inhaler Inhale 2 puffs into the lungs every 6 hours 18 g 3     albuterol 90 MCG/ACT inhaler Inhale 1-2 puffs into the lungs every 6 hours as needed for shortness of breath / dyspnea. 3 Inhaler 3     fluticasone-salmeterol (ADVAIR) 250-50 MCG/ACT inhaler Inhale 1 puff into the lungs every 12 hours 180 each 4     methylphenidate HCl ER  "(CONCERTA) 18 MG CR tablet        sertraline (ZOLOFT) 100 MG tablet Take 1 tablet (100 mg) by mouth daily 90 tablet 3       OBJECTIVE:                                                    Physical Exam :  Blood pressure 117/76, pulse 69, temperature 97.9  F (36.6  C), temperature source Tympanic, resp. rate 18, height 1.575 m (5' 2\"), weight 80.6 kg (177 lb 12.8 oz), SpO2 99 %, not currently breastfeeding.     NAD, appears comfortable  Skin clear, no rashes  Neck: supple, no JVD,  no thyroidmegaly  Lymph nodes non palpable in the cervical, supraclavicular axillaries,   Chest: clear to auscultation with good respiratory effort  Cardiac: S1S2, RRR, no mgr appreciated  Abdomen: soft, not tender, not distended, audible bowel sound, no hepatosplenomegaly, no palpable masses, no abdominal bruits  Extremities: no cyanosis, clubbing or edema.   Neuro: A, Ox3, no focal signs.  Breast exam in supine and erect position: they are symmetrical, no skin changes, no tenderness or nodes on palpation. Nipples are erect, no skin lesions, no discharge on pressure.    Pelvic exam: Normal external genitals, normal appearing perineum, normal appearing urethra,  vaginal mucosa pink, no discharge, Cervix appears normal, Pap smear obtained. On bimanual exam, I did not feel any uterus or ovarian masses, and she denies any tenderness.         Shirley Rico MD  Internal Medicine          SUBJECTIVE:   CC: Althea Lopez is an 32 year old woman who presents for preventive health visit.       Patient has been advised of split billing requirements and indicates understanding: Yes  Healthy Habits:     Getting at least 3 servings of Calcium per day:  Yes    Bi-annual eye exam:  Yes    Dental care twice a year:  Yes    Sleep apnea or symptoms of sleep apnea:  None    Diet:  Carbohydrate counting and Gluten-free/reduced    Frequency of exercise:  4-5 days/week    Duration of exercise:  45-60 minutes    Taking medications regularly:  " No    Barriers to taking medications:  Not applicable    Medication side effects:  None    PHQ-2 Total Score: 2    Additional concerns today:  No              Today's PHQ-2 Score:   PHQ-2 ( 1999 Pfizer) 8/3/2022   Q1: Little interest or pleasure in doing things 1   Q2: Feeling down, depressed or hopeless 1   PHQ-2 Score 2   PHQ-2 Total Score (12-17 Years)- Positive if 3 or more points; Administer PHQ-A if positive -   Q1: Little interest or pleasure in doing things Several days   Q2: Feeling down, depressed or hopeless Several days   PHQ-2 Score 2       Abuse: Current or Past (Physical, Sexual or Emotional) - No  Do you feel safe in your environment? Yes        Social History     Tobacco Use     Smoking status: Never Smoker     Smokeless tobacco: Never Used   Substance Use Topics     Alcohol use: No         Alcohol Use 8/3/2022   Prescreen: >3 drinks/day or >7 drinks/week? No   Prescreen: >3 drinks/day or >7 drinks/week? -       Reviewed orders with patient.  Reviewed health maintenance and updated orders accordingly - Yes  Labs reviewed in EPIC    Breast Cancer Screening:    Breast CA Risk Assessment (FHS-7) 8/3/2022   Do you have a family history of breast, colon, or ovarian cancer? No / Unknown           Pertinent mammograms are reviewed under the imaging tab.    History of abnormal Pap smear:   PAP / HPV Latest Ref Rng & Units 7/9/2020 8/13/2018 6/2/2017   PAP (Historical) - NIL ASC-US(A) NIL   HPV16 NEG:Negative Negative Negative Negative   HPV18 NEG:Negative Negative Negative Negative   HRHPV NEG:Negative Positive(A) Negative Negative     Reviewed and updated as needed this visit by clinical staff   Tobacco  Allergies  Meds   Med Hx  Surg Hx  Fam Hx  Soc Hx          Reviewed and updated as needed this visit by Provider                       Review of Systems   Constitutional: Negative for chills and fever.   HENT: Negative for congestion, ear pain, hearing loss and sore throat.    Eyes: Negative for pain  "and visual disturbance.   Respiratory: Negative for cough and shortness of breath.    Cardiovascular: Negative for chest pain, palpitations and peripheral edema.   Gastrointestinal: Positive for heartburn. Negative for abdominal pain, constipation, diarrhea, hematochezia and nausea.   Genitourinary: Negative for dysuria, frequency, genital sores, hematuria and urgency.   Musculoskeletal: Negative for arthralgias, joint swelling and myalgias.   Skin: Negative for rash.   Neurological: Positive for headaches. Negative for dizziness, weakness and paresthesias.   Psychiatric/Behavioral: Negative for mood changes. The patient is not nervous/anxious.        Patient has been advised of split billing requirements and indicates understanding: Yes At the check in, at the      COUNSELING:  Reviewed preventive health counseling, as reflected in patient instructions       Regular exercise       Healthy diet/nutrition    Estimated body mass index is 29.26 kg/m  as calculated from the following:    Height as of this encounter: 1.575 m (5' 2\").    Weight as of 2/12/22: 72.6 kg (160 lb).    Weight management plan: Discussed healthy diet and exercise guidelines    She reports that she has never smoked. She has never used smokeless tobacco.      Counseling Resources:  ATP IV Guidelines  Pooled Cohorts Equation Calculator  Breast Cancer Risk Calculator  BRCA-Related Cancer Risk Assessment: FHS-7 Tool  FRAX Risk Assessment  ICSI Preventive Guidelines  Dietary Guidelines for Americans, 2010  USDA's MyPlate  ASA Prophylaxis  Lung CA Screening    Shirley Siu MD  [facility]  "

## 2022-08-04 LAB
C TRACH DNA SPEC QL NAA+PROBE: NEGATIVE
N GONORRHOEA DNA SPEC QL NAA+PROBE: NEGATIVE

## 2022-08-05 LAB
BKR LAB AP GYN ADEQUACY: NORMAL
BKR LAB AP GYN INTERPRETATION: NORMAL
BKR LAB AP HPV REFLEX: NORMAL
BKR LAB AP PREVIOUS ABNORMAL: NORMAL
PATH REPORT.COMMENTS IMP SPEC: NORMAL
PATH REPORT.COMMENTS IMP SPEC: NORMAL
PATH REPORT.RELEVANT HX SPEC: NORMAL

## 2022-08-09 ENCOUNTER — PATIENT OUTREACH (OUTPATIENT)
Dept: INTERNAL MEDICINE | Facility: CLINIC | Age: 32
End: 2022-08-09

## 2022-08-09 LAB
HUMAN PAPILLOMA VIRUS 16 DNA: NEGATIVE
HUMAN PAPILLOMA VIRUS 18 DNA: NEGATIVE
HUMAN PAPILLOMA VIRUS FINAL DIAGNOSIS: NORMAL
HUMAN PAPILLOMA VIRUS OTHER HR: NEGATIVE

## 2022-10-15 ENCOUNTER — HEALTH MAINTENANCE LETTER (OUTPATIENT)
Age: 32
End: 2022-10-15

## 2022-11-16 ENCOUNTER — OFFICE VISIT (OUTPATIENT)
Dept: OPTOMETRY | Facility: CLINIC | Age: 32
End: 2022-11-16
Payer: COMMERCIAL

## 2022-11-16 DIAGNOSIS — Z01.01 ENCOUNTER FOR EXAMINATION OF EYES AND VISION WITH ABNORMAL FINDINGS: Primary | ICD-10-CM

## 2022-11-16 DIAGNOSIS — Z98.890 S/P LASIK SURGERY: ICD-10-CM

## 2022-11-16 DIAGNOSIS — H52.13 MYOPIA OF BOTH EYES: ICD-10-CM

## 2022-11-16 PROCEDURE — 92004 COMPRE OPH EXAM NEW PT 1/>: CPT | Performed by: OPTOMETRIST

## 2022-11-16 ASSESSMENT — REFRACTION_MANIFEST
OD_CYLINDER: SPHERE
OS_CYLINDER: SPHERE
OS_SPHERE: -1.00
OD_SPHERE: -0.50

## 2022-11-16 ASSESSMENT — TONOMETRY
OS_IOP_MMHG: 14
IOP_METHOD: APPLANATION
OD_IOP_MMHG: 14

## 2022-11-16 ASSESSMENT — CUP TO DISC RATIO
OD_RATIO: 0.4
OS_RATIO: 0.4

## 2022-11-16 ASSESSMENT — CONF VISUAL FIELD
OD_NORMAL: 1
OS_SUPERIOR_NASAL_RESTRICTION: 0
OD_SUPERIOR_TEMPORAL_RESTRICTION: 0
OD_INFERIOR_TEMPORAL_RESTRICTION: 0
OD_INFERIOR_NASAL_RESTRICTION: 0
OS_INFERIOR_TEMPORAL_RESTRICTION: 0
OD_SUPERIOR_NASAL_RESTRICTION: 0
OS_NORMAL: 1
OS_INFERIOR_NASAL_RESTRICTION: 0
OS_SUPERIOR_TEMPORAL_RESTRICTION: 0

## 2022-11-16 ASSESSMENT — SLIT LAMP EXAM - LIDS
COMMENTS: NORMAL
COMMENTS: NORMAL

## 2022-11-16 ASSESSMENT — EXTERNAL EXAM - RIGHT EYE: OD_EXAM: NORMAL

## 2022-11-16 ASSESSMENT — VISUAL ACUITY
OS_SC: 20/30+
METHOD: SNELLEN - LINEAR
OD_SC: 20/20-

## 2022-11-16 ASSESSMENT — EXTERNAL EXAM - LEFT EYE: OS_EXAM: NORMAL

## 2022-11-16 NOTE — LETTER
11/16/2022         RE: Althea Lopez  [address] [address] [place] [facility] [place] [facility] [address]        Dear Colleague,    Thank you for referring your patient, Althea Lopez, to the [facility]. Please see a copy of my visit note below.    Chief Complaint   Patient presents with     Annual Eye Exam     - s/p LASIK @ [facility] ~ 1 year ago    Last Eye Exam: 1 year at [place]; 2020 at [facility]  Dilated Previously: Yes    What are you currently using to see?  does not use glasses or contacts    Distance Vision Acuity: Satisfied with vision overall; occasional blur    Near Vision Acuity: Satisfied overall, sometimes noticing a headache after reading    Eye Comfort: good  Do you use eye drops? : Yes: rarely ATs when eyes get dry or irritated       Medical, surgical and family histories reviewed and updated 11/16/2022.       OBJECTIVE: See Ophthalmology exam    ASSESSMENT:    ICD-10-CM    1. Encounter for examination of eyes and vision with abnormal findings  Z01.01       2. S/P LASIK surgery  Z98.890       3. Myopia of both eyes  H52.13           PLAN:    Althea Lopez aware  eye exam results will be sent to Shirley Siu.  Patient Instructions   Mild residual glasses prescription post-LASIK.   Glasses Rx provided today. Optional to fill.     Continue use of artificial tears as needed.     Return in 1 year for a comprehensive eye exam, or sooner if needed.      The effects of the dilating drops last for 4- 6 hours.  You will be more sensitive to light and vision will be blurry up close.  Mydriatic sunglasses were given if needed.     Benjamin Swenson, SILVIA  [facility]  [address]  [place]  [address]    [phone]                Again, thank you for allowing me to participate in the care of your patient.        Sincerely,        Benjamin Swenson, SILVIA

## 2022-11-16 NOTE — PATIENT INSTRUCTIONS
Mild residual glasses prescription post-LASIK.   Glasses Rx provided today. Optional to fill.     Continue use of artificial tears as needed.     Return in 1 year for a comprehensive eye exam, or sooner if needed.      The effects of the dilating drops last for 4- 6 hours.  You will be more sensitive to light and vision will be blurry up close.  Mydriatic sunglasses were given if needed.     Benjamin Swenson, SILVIA  [address]  [place]  [address] [phone]

## 2022-11-16 NOTE — PROGRESS NOTES
Chief Complaint   Patient presents with     Annual Eye Exam     - s/p LASIK @ [facility] ~ 1 year ago    Last Eye Exam: 1 year at [facility]; 2020 at [place]  Dilated Previously: Yes    What are you currently using to see?  does not use glasses or contacts    Distance Vision Acuity: Satisfied with vision overall; occasional blur    Near Vision Acuity: Satisfied overall, sometimes noticing a headache after reading    Eye Comfort: good  Do you use eye drops? : Yes: rarely ATs when eyes get dry or irritated       Medical, surgical and family histories reviewed and updated 11/16/2022.       OBJECTIVE: See Ophthalmology exam    ASSESSMENT:    ICD-10-CM    1. Encounter for examination of eyes and vision with abnormal findings  Z01.01       2. S/P LASIK surgery  Z98.890       3. Myopia of both eyes  H52.13           PLAN:    Althea Lopez aware  eye exam results will be sent to Shirley Siu.  Patient Instructions   Mild residual glasses prescription post-LASIK.   Glasses Rx provided today. Optional to fill.     Continue use of artificial tears as needed.     Return in 1 year for a comprehensive eye exam, or sooner if needed.      The effects of the dilating drops last for 4- 6 hours.  You will be more sensitive to light and vision will be blurry up close.  Mydriatic sunglasses were given if needed.     Benjamin Swenson, OD  [facility]  [address] [place] [address]. [place]  [id number]    [phone]

## 2023-02-07 ENCOUNTER — MYC MEDICAL ADVICE (OUTPATIENT)
Dept: INTERNAL MEDICINE | Facility: CLINIC | Age: 33
End: 2023-02-07
Payer: COMMERCIAL

## 2023-02-07 DIAGNOSIS — J45.30 MILD PERSISTENT ASTHMA WITHOUT COMPLICATION: ICD-10-CM

## 2023-02-08 RX ORDER — FLUTICASONE PROPIONATE AND SALMETEROL XINAFOATE 230; 21 UG/1; UG/1
2 AEROSOL, METERED RESPIRATORY (INHALATION) 2 TIMES DAILY
Qty: 12 G | Refills: 3 | Status: SHIPPED | OUTPATIENT
Start: 2023-02-08 | End: 2023-09-05

## 2023-07-05 ENCOUNTER — PATIENT OUTREACH (OUTPATIENT)
Dept: CARE COORDINATION | Facility: CLINIC | Age: 33
End: 2023-07-05
Payer: COMMERCIAL

## 2023-07-13 ENCOUNTER — PATIENT OUTREACH (OUTPATIENT)
Dept: INTERNAL MEDICINE | Facility: CLINIC | Age: 33
End: 2023-07-13
Payer: COMMERCIAL

## 2023-07-19 ENCOUNTER — PATIENT OUTREACH (OUTPATIENT)
Dept: CARE COORDINATION | Facility: CLINIC | Age: 33
End: 2023-07-19
Payer: COMMERCIAL

## 2023-08-22 ENCOUNTER — OFFICE VISIT (OUTPATIENT)
Dept: URGENT CARE | Facility: URGENT CARE | Age: 33
End: 2023-08-22
Payer: COMMERCIAL

## 2023-08-22 VITALS
HEART RATE: 75 BPM | BODY MASS INDEX: 33.65 KG/M2 | WEIGHT: 184 LBS | SYSTOLIC BLOOD PRESSURE: 118 MMHG | DIASTOLIC BLOOD PRESSURE: 76 MMHG | RESPIRATION RATE: 14 BRPM | OXYGEN SATURATION: 98 % | TEMPERATURE: 98.2 F

## 2023-08-22 DIAGNOSIS — R07.0 THROAT PAIN: Primary | ICD-10-CM

## 2023-08-22 DIAGNOSIS — R51.9 SINUS HEADACHE: ICD-10-CM

## 2023-08-22 DIAGNOSIS — R05.1 ACUTE COUGH: ICD-10-CM

## 2023-08-22 LAB — DEPRECATED S PYO AG THROAT QL EIA: NEGATIVE

## 2023-08-22 PROCEDURE — 87651 STREP A DNA AMP PROBE: CPT | Performed by: PHYSICIAN ASSISTANT

## 2023-08-22 PROCEDURE — 99213 OFFICE O/P EST LOW 20 MIN: CPT | Performed by: PHYSICIAN ASSISTANT

## 2023-08-22 NOTE — PROGRESS NOTES
Assessment & Plan     Throat pain  Rapid strep is negative today.  Throat culture is pending.  No evidence of peritonsillar abscess.  Supportive care measures advised--Tylenol/Motrin as needed for pain, throat lozenges.  We will communicate any positive finding on the throat culture result.  Follow-up if any worsening symptoms.  Patient understands and agrees with the plan.    - Streptococcus A Rapid Screen w/Reflex to PCR - Clinic Collect  - Group A Streptococcus PCR Throat Swab    Acute cough  Acute problem.  On exam patient is in no acute respiratory distress.  Lungs are clear.  Supportive care measures advised.  Recommended   OTC cough medication as needed.  Patient educational information provided regarding course of symptoms.  Advised to keep monitoring symptoms.  Follow-up if any worsening symptoms.  Patient agrees with the plan.    Sinus headache  Acute problem.  We discussed most likely viral sinusitis at this time.  Recommended Flonase nasal spray.  Tylenol/Motrin as needed for headache. Push fluids. If symptoms not improving as anticipated in the next 5 to 7 days recommended follow-up, would then be reasonable to treat with antibiotic.  Follow-up sooner if any worsening symptoms. Patient agrees.      No follow-ups on file.    Chioma Thrasher PA-C  [facility] [place] is a 33 year old female who presents to clinic today for the following health issues:  Chief Complaint   Patient presents with    Sick     SICK X 5 days-- started with ST, congestion, tight chest, HA, check ears , some productive cough, drainage -- took some dayquil at 130pm today  - HAS astham     HPI    URI Adult    Onset of symptoms was 4 day(s) ago.  Course of illness is same.    Severity moderate  Current and Associated symptoms: facial pain/pressure, HA, cough occasionally productive, sore throat  Denies fevers or chills, no chest pain or shortness of breath  Treatment measures tried include  OTC Cough med.  Predisposing factors include HX of asthma.      Review of Systems  Constitutional, HEENT, cardiovascular, pulmonary, GI, , musculoskeletal, neuro, skin, endocrine and psych systems are negative, except as otherwise noted.      Objective    /76 (BP Location: Right arm, Patient Position: Chair, Cuff Size: Adult Regular)   Pulse 75   Temp 98.2  F (36.8  C) (Oral)   Resp 14   Wt 83.5 kg (184 lb)   LMP 08/10/2023 (Exact Date)   SpO2 98%   BMI 33.65 kg/m    Physical Exam   GENERAL: healthy, alert and no distress  HENT: ear canals and TM's normal,  mouth without ulcers or lesions, throat is moist and pink, uvula is midline  RESP: lungs clear to auscultation - no rales, rhonchi or wheezes  CV: regular rate and rhythm, normal S1 S2  MS: no gross musculoskeletal defects noted, no edema    Results for orders placed or performed in visit on 08/22/23 (from the past 24 hour(s))   Streptococcus A Rapid Screen w/Reflex to PCR - Clinic Collect    Specimen: Throat; Swab   Result Value Ref Range    Group A Strep antigen Negative Negative

## 2023-08-23 LAB — GROUP A STREP BY PCR: NOT DETECTED

## 2023-08-25 ENCOUNTER — E-VISIT (OUTPATIENT)
Dept: INTERNAL MEDICINE | Facility: CLINIC | Age: 33
End: 2023-08-25
Payer: COMMERCIAL

## 2023-08-25 DIAGNOSIS — J01.00 ACUTE MAXILLARY SINUSITIS, RECURRENCE NOT SPECIFIED: Primary | ICD-10-CM

## 2023-08-25 PROCEDURE — 99207 PR NON-BILLABLE SERV PER CHARTING: CPT | Performed by: INTERNAL MEDICINE

## 2023-09-05 DIAGNOSIS — J45.30 MILD PERSISTENT ASTHMA WITHOUT COMPLICATION: ICD-10-CM

## 2023-09-05 RX ORDER — FLUTICASONE PROPIONATE AND SALMETEROL XINAFOATE 230; 21 UG/1; UG/1
2 AEROSOL, METERED RESPIRATORY (INHALATION) 2 TIMES DAILY
Qty: 12 G | Refills: 0 | Status: SHIPPED | OUTPATIENT
Start: 2023-09-05 | End: 2023-10-27

## 2023-09-07 NOTE — TELEPHONE ENCOUNTER
FYI to provider - Patient is lost to pap tracking follow-up. Attempts to contact pt have been made per reminder process and there has been no reply and/or no appt scheduled. Contact hx listed below.     3/15/11 ASCUS  positive for HPV 53 DNA @ age 20. The presence of HPV 53. DNA in lesions of the anogenital tract is considered a probable high risk factor for the development of malignancy  5/10/12  LSIL  9/19/12  [place]: SAL 1; LSIL pap, + HR HPV (not 16/18)  3/16/13 NIL pap, + HR HPV (66). Plan MD recommends repeat pap in 6 months (9/2013), Brandyn RN  3/25/14 Dx ASCUS pap, neg HR HPV. Plan: colp within 3 months.   [place] was not done.   5/15/15 Pap LSIL. Plan: colp within 3 months.   10/19/15 [place] SAL 2. Dx pap LSIL with pos HR HPV. Plan: pap/HPV in 6 months or LEEP or cryo.   2/10/16 [place] SAL I. Dx pap LSIL with pos HPV. Plan: cotest in 1 year.   06/02/17 NIL, Neg HPV. Plan: cotest in 1 year  8/13/18 Dx ASCUS pap, neg HR HPV. Plan colp per provider due by 11/13/18 5/31/19 [place] Bx: SAL 1, ECC: neg. Plan 1 year cotest   7/9/20 NIL pap, +HR HPV (not 16/18). Plan: colp, due by 10/9/20  2/26/21 Lost to follow-up for pap tracking   8/3/22 NIL pap, neg HPV. Plan: cotest in 1 year  7/13/23 Reminder mychart  8/11/23 Reminder call- Spoke with pt  9/7/23 Lost to follow up

## 2023-10-03 ENCOUNTER — TELEPHONE (OUTPATIENT)
Dept: INTERNAL MEDICINE | Facility: CLINIC | Age: 33
End: 2023-10-03
Payer: COMMERCIAL

## 2023-10-03 NOTE — TELEPHONE ENCOUNTER
Patient Quality Outreach    Patient is due for the following:   Physical Preventive Adult Physical    Next Steps:   Schedule a Adult Preventative    Type of outreach:    Phone, spoke to patient/parent. Said she would call to schedule her appointment.    Next Steps:  Reach out within 90 days via [employer].    Max number of attempts reached: No. Will try again in 90 days if patient still on fail list.    Questions for provider review:    None           Niecy Garcia

## 2023-10-17 ENCOUNTER — E-VISIT (OUTPATIENT)
Dept: INTERNAL MEDICINE | Facility: CLINIC | Age: 33
End: 2023-10-17
Payer: COMMERCIAL

## 2023-10-17 DIAGNOSIS — J01.90 ACUTE NON-RECURRENT SINUSITIS, UNSPECIFIED LOCATION: Primary | ICD-10-CM

## 2023-10-17 PROCEDURE — 99207 PR NON-BILLABLE SERV PER CHARTING: CPT | Performed by: INTERNAL MEDICINE

## 2023-10-22 ENCOUNTER — E-VISIT (OUTPATIENT)
Dept: INTERNAL MEDICINE | Facility: CLINIC | Age: 33
End: 2023-10-22
Payer: COMMERCIAL

## 2023-10-22 DIAGNOSIS — J01.90 ACUTE SINUSITIS WITH SYMPTOMS > 10 DAYS: Primary | ICD-10-CM

## 2023-10-22 PROCEDURE — 99421 OL DIG E/M SVC 5-10 MIN: CPT | Performed by: INTERNAL MEDICINE

## 2023-10-26 DIAGNOSIS — J45.30 MILD PERSISTENT ASTHMA WITHOUT COMPLICATION: ICD-10-CM

## 2023-10-27 RX ORDER — FLUTICASONE PROPIONATE AND SALMETEROL XINAFOATE 230; 21 UG/1; UG/1
2 AEROSOL, METERED RESPIRATORY (INHALATION) 2 TIMES DAILY
Qty: 12 G | Refills: 0 | Status: SHIPPED | OUTPATIENT
Start: 2023-10-27 | End: 2023-12-15

## 2023-10-29 ENCOUNTER — HEALTH MAINTENANCE LETTER (OUTPATIENT)
Age: 33
End: 2023-10-29

## 2023-11-15 ENCOUNTER — OFFICE VISIT (OUTPATIENT)
Dept: URGENT CARE | Facility: URGENT CARE | Age: 33
End: 2023-11-15
Payer: COMMERCIAL

## 2023-11-15 VITALS
DIASTOLIC BLOOD PRESSURE: 72 MMHG | OXYGEN SATURATION: 98 % | SYSTOLIC BLOOD PRESSURE: 120 MMHG | HEART RATE: 63 BPM | RESPIRATION RATE: 16 BRPM | TEMPERATURE: 97.5 F

## 2023-11-15 DIAGNOSIS — R07.0 THROAT PAIN: Primary | ICD-10-CM

## 2023-11-15 LAB — DEPRECATED S PYO AG THROAT QL EIA: NEGATIVE

## 2023-11-15 PROCEDURE — 87651 STREP A DNA AMP PROBE: CPT | Performed by: FAMILY MEDICINE

## 2023-11-15 PROCEDURE — 87635 SARS-COV-2 COVID-19 AMP PRB: CPT | Performed by: FAMILY MEDICINE

## 2023-11-15 PROCEDURE — 99213 OFFICE O/P EST LOW 20 MIN: CPT | Performed by: FAMILY MEDICINE

## 2023-11-15 NOTE — PATIENT INSTRUCTIONS
COVID PCR and strep PCR results should be back tomorrow afternoon.    You can try Flonase nasal spray to help reduce the amount of mucus draining down the back of your throat and irritating those tissues.  This takes several days to take effect, so please be patient with it and stick with it for a week.    You can gargle with Chloraseptic spray to help with the sore throat.

## 2023-11-16 LAB — GROUP A STREP BY PCR: NOT DETECTED

## 2023-11-16 NOTE — PROGRESS NOTES
ASSESSMENT:    ICD-10-CM    1. Throat pain  R07.0 Streptococcus A Rapid Screen w/Reflex to PCR - Clinic Collect     Group A Streptococcus PCR Throat Swab     Symptomatic COVID-19 Virus (Coronavirus) by PCR Nose      Pharyngitis, likely viral etiology. Rapid strep negative, PCR pending. COVID pending.     PLAN:  Patient Instructions   COVID PCR and strep PCR results should be back tomorrow afternoon.    You can try Flonase nasal spray to help reduce the amount of mucus draining down the back of your throat and irritating those tissues.  This takes several days to take effect, so please be patient with it and stick with it for a week.    You can gargle with Chloraseptic spray to help with the sore throat.    SUBJECTIVE:  Althea Lopez is a 33 year old who presents to  with sore throat, cough, headache, fatigue, and loss of voice for one week. Cough is dry and non-productive. Symptoms have not improved over the past week. Clearing throat frequently. Denies fever or chills.     OBJECTIVE:  /72 (BP Location: Right arm, Patient Position: Sitting, Cuff Size: Adult Regular)   Pulse 63   Temp 97.5  F (36.4  C) (Tympanic)   Resp 16   SpO2 98%   Physical Exam  Constitutional:       General: She is not in acute distress.  HENT:      Right Ear: Tympanic membrane normal.      Left Ear: Tympanic membrane normal.      Nose: Congestion present.      Mouth/Throat:      Pharynx: Posterior oropharyngeal erythema present. No oropharyngeal exudate.   Cardiovascular:      Rate and Rhythm: Normal rate and regular rhythm.   Pulmonary:      Effort: Pulmonary effort is normal.      Breath sounds: Normal breath sounds.   Musculoskeletal:      Cervical back: No tenderness.   Lymphadenopathy:      Cervical: No cervical adenopathy.   Skin:     General: Skin is warm and dry.   Neurological:      Mental Status: She is alert.       Results for orders placed or performed in visit on 11/15/23   Streptococcus A Rapid Screen w/Reflex  to PCR - Clinic Collect     Status: Normal    Specimen: Throat; Swab   Result Value Ref Range    Group A Strep antigen Negative Negative

## 2023-11-17 LAB — SARS-COV-2 RNA RESP QL NAA+PROBE: NEGATIVE

## 2023-12-15 DIAGNOSIS — J45.30 MILD PERSISTENT ASTHMA WITHOUT COMPLICATION: ICD-10-CM

## 2023-12-15 RX ORDER — FLUTICASONE PROPIONATE AND SALMETEROL XINAFOATE 230; 21 UG/1; UG/1
2 AEROSOL, METERED RESPIRATORY (INHALATION) 2 TIMES DAILY
Qty: 12 G | Refills: 1 | Status: SHIPPED | OUTPATIENT
Start: 2023-12-15 | End: 2024-03-20

## 2024-02-05 SDOH — HEALTH STABILITY: PHYSICAL HEALTH: ON AVERAGE, HOW MANY DAYS PER WEEK DO YOU ENGAGE IN MODERATE TO STRENUOUS EXERCISE (LIKE A BRISK WALK)?: 4 DAYS

## 2024-02-05 SDOH — HEALTH STABILITY: PHYSICAL HEALTH: ON AVERAGE, HOW MANY MINUTES DO YOU ENGAGE IN EXERCISE AT THIS LEVEL?: 60 MIN

## 2024-02-05 ASSESSMENT — ASTHMA QUESTIONNAIRES
ACT_TOTALSCORE: 23
QUESTION_5 LAST FOUR WEEKS HOW WOULD YOU RATE YOUR ASTHMA CONTROL: COMPLETELY CONTROLLED
ACT_TOTALSCORE: 23
QUESTION_2 LAST FOUR WEEKS HOW OFTEN HAVE YOU HAD SHORTNESS OF BREATH: ONCE OR TWICE A WEEK
QUESTION_1 LAST FOUR WEEKS HOW MUCH OF THE TIME DID YOUR ASTHMA KEEP YOU FROM GETTING AS MUCH DONE AT WORK, SCHOOL OR AT HOME: NONE OF THE TIME
QUESTION_3 LAST FOUR WEEKS HOW OFTEN DID YOUR ASTHMA SYMPTOMS (WHEEZING, COUGHING, SHORTNESS OF BREATH, CHEST TIGHTNESS OR PAIN) WAKE YOU UP AT NIGHT OR EARLIER THAN USUAL IN THE MORNING: NOT AT ALL
QUESTION_4 LAST FOUR WEEKS HOW OFTEN HAVE YOU USED YOUR RESCUE INHALER OR NEBULIZER MEDICATION (SUCH AS ALBUTEROL): ONCE A WEEK OR LESS

## 2024-02-05 ASSESSMENT — SOCIAL DETERMINANTS OF HEALTH (SDOH): HOW OFTEN DO YOU GET TOGETHER WITH FRIENDS OR RELATIVES?: ONCE A WEEK

## 2024-02-06 ENCOUNTER — OFFICE VISIT (OUTPATIENT)
Dept: INTERNAL MEDICINE | Facility: CLINIC | Age: 34
End: 2024-02-06
Payer: COMMERCIAL

## 2024-02-06 VITALS
WEIGHT: 187.2 LBS | TEMPERATURE: 97.8 F | RESPIRATION RATE: 18 BRPM | HEART RATE: 77 BPM | DIASTOLIC BLOOD PRESSURE: 72 MMHG | BODY MASS INDEX: 34.45 KG/M2 | SYSTOLIC BLOOD PRESSURE: 115 MMHG | HEIGHT: 62 IN | OXYGEN SATURATION: 98 %

## 2024-02-06 DIAGNOSIS — Z00.00 ROUTINE GENERAL MEDICAL EXAMINATION AT A HEALTH CARE FACILITY: Primary | ICD-10-CM

## 2024-02-06 DIAGNOSIS — Z80.3 FAMILY HISTORY OF MALIGNANT NEOPLASM OF BREAST: ICD-10-CM

## 2024-02-06 PROCEDURE — 99395 PREV VISIT EST AGE 18-39: CPT | Performed by: INTERNAL MEDICINE

## 2024-02-06 ASSESSMENT — PAIN SCALES - GENERAL: PAINLEVEL: NO PAIN (0)

## 2024-02-06 NOTE — PROGRESS NOTES
Dr Rico's note    Patient's instructions / PLAN:                                                        Plan:  Schedule in office visit Feb 13 at 09:40  2. Schedule fasting labs the morning of Feb 13   3. You are due for Tetanus booster ( Tdap) in 2025 or sooner if any dirty wound       ASSESSMENT & PLAN:                                                      (Z00.00) Routine general medical examination at a health care facility  (primary encounter diagnosis)  Comment:   Plan: CBC with platelets, Lipid panel reflex to         direct LDL Fasting, Comprehensive metabolic         panel, TSH with free T4 reflex               Chief Complaint:                                                        Annual exam  Follow up chronic medical problems      SUBJECTIVE:                                                    History of present illness     We reviewed the chronic medical problems as above.   I reviewed the recent tests results in Epic     Mother age 59 dx w Breast Cancer last year.  She is not interested yet in mamm     ROS:     See below    General: Negative for fever, chills, major weight changes, fatigue  Skin: Negative for rashes, abnormal spots  Eyes: Negative for blurred or double vision  ENT/mouth: Negative for sinuses discomfort, earache, sore throat  Respiratory: Negative for cough, wheezes, chronic lung disease  Cardiovascular: Negative for rest or exertional chest pain, shortness of breath, palpitations, leg edema,   Gastrointestinal: Negative for vomiting, abdominal pain, heartburn, blood in stool, diarrhea, constipation  Genitourinary: Negative for urinary frequency, blood in urine, history of kidney stones  Female: Negative for abnormal vaginal bleeding, vaginal discharge  Neuro: Negative for headaches, numbness, tingling, weakness in arms or legs, history of seizure, recent syncope  Psychiatry: Negative for depression, anxiety, suicidal thoughts  Endo: Negative for known thyroid disease,  diabetes.  Hemato/Lymph: Negative for nodes, easy bleeding, history of DVT, blood transfusion  Musculoskeletal: Negative for joint swelling, back pain      PMHx: - reviewed  Past Medical History:   Diagnosis Date    Allergic rhinitis, cause unspecified     sees allergist for allergy induced asthma.    ASCUS on Pap smear 3/15/11, 3/25/14, 8/13/18    + HPV < 20 yrs of age    ASTHMA UNSPECIFIED 7/27/2004    Frequent UTI     History of colposcopy with cervical biopsy 10/20/15, 2/10/16, 5/31/19    SAL 2, SAL I, SAL 1    Intermittent asthma     Intermittent asthma     LSIL (low grade squamous intraepithelial lesion) on Pap smear 5/10/12, 9/19/12, 5/15/15, 10/19/15, 2/10/16    HPV 66 High Risk    Moderate major depression (H)      psychiatrist: dr Patricia Reyes       PSHx: reviewed  Past Surgical History:   Procedure Laterality Date    COLPOSCOPY CERVIX, BIOPSY CERVIX, ENDOCERVICAL CURETTAGE, COMBINED  9/19/2012        Soc Hx: No daily alcohol, no smoking  Social History     Socioeconomic History    Marital status:      Spouse name: Not on file    Number of children: Not on file    Years of education: Not on file    Highest education level: Not on file   Occupational History    Not on file   Tobacco Use    Smoking status: Never     Passive exposure: Never    Smokeless tobacco: Never   Vaping Use    Vaping Use: Never used   Substance and Sexual Activity    Alcohol use: No    Drug use: No    Sexual activity: Yes     Partners: Male     Birth control/protection: Pill   Other Topics Concern    Parent/sibling w/ CABG, MI or angioplasty before 65F 55M? Not Asked   Social History Narrative    Not on file     Social Determinants of Health     Financial Resource Strain: Low Risk  (2/5/2024)    Financial Resource Strain     Within the past 12 months, have you or your family members you live with been unable to get utilities (heat, electricity) when it was really needed?: No   Food Insecurity: Low Risk  (2/5/2024)    Food  Insecurity     Within the past 12 months, did you worry that your food would run out before you got money to buy more?: No     Within the past 12 months, did the food you bought just not last and you didn t have money to get more?: No   Transportation Needs: Low Risk  (2/5/2024)    Transportation Needs     Within the past 12 months, has lack of transportation kept you from medical appointments, getting your medicines, non-medical meetings or appointments, work, or from getting things that you need?: No   Physical Activity: Sufficiently Active (2/5/2024)    Exercise Vital Sign     Days of Exercise per Week: 4 days     Minutes of Exercise per Session: 60 min   Stress: Stress Concern Present (2/5/2024)    [employer] - Occupational Stress Questionnaire     Feeling of Stress : To some extent   Social Connections: Unknown (2/5/2024)    Social Connection and Isolation Panel [NHANES]     Frequency of Communication with Friends and Family: Not on file     Frequency of Social Gatherings with Friends and Family: Once a week     Attends [employer] Services: Not on file     Active Member of Clubs or Organizations: Not on file     Attends Club or Organization Meetings: Not on file     Marital Status: Not on file   Interpersonal Safety: Low Risk  (2/6/2024)    Interpersonal Safety     Do you feel physically and emotionally safe where you currently live?: Yes     Within the past 12 months, have you been hit, slapped, kicked or otherwise physically hurt by someone?: No     Within the past 12 months, have you been humiliated or emotionally abused in other ways by your partner or ex-partner?: No   Housing Stability: Low Risk  (2/5/2024)    Housing Stability     Do you have housing? : Yes     Are you worried about losing your housing?: No        Fam Hx: reviewed  Family History   Problem Relation Age of Onset    Cerebrovascular Disease Maternal Grandmother 60        smoker    Glaucoma Maternal Grandmother   "   Thyroid Disease Maternal Aunt         grave's dz    Asthma Mother     Asthma Father     Prostate Cancer Maternal Grandfather     Macular Degeneration Paternal Grandfather     Thyroid Disease Sister     Diabetes Other          Screening: reviewed      All: reviewed    Meds: reviewed  Current Outpatient Medications   Medication Sig Dispense Refill    fluticasone-salmeterol (ADVAIR HFA) 230-21 MCG/ACT inhaler Inhale 2 puffs into the lungs 2 times daily 12 g 1    fluticasone-salmeterol (ADVAIR) 250-50 MCG/ACT inhaler Inhale 1 puff into the lungs every 12 hours 180 each 4    methylphenidate HCl ER (CONCERTA) 18 MG CR tablet       sertraline (ZOLOFT) 100 MG tablet Take 1 tablet (100 mg) by mouth daily 90 tablet 3       OBJECTIVE:                                                    Physical Exam :  Blood pressure 115/72, pulse 77, temperature 97.8  F (36.6  C), temperature source Tympanic, resp. rate 18, height 1.575 m (5' 2\"), weight 84.9 kg (187 lb 3.2 oz), SpO2 98%, not currently breastfeeding.     NAD, appears comfortable  Skin clear, no rashes  Neck: supple, no JVD,  no thyroidmegaly  Lymph nodes non palpable in the cervical, supraclavicular axillaries,   Chest: clear to auscultation with good respiratory effort  Cardiac: S1S2, RRR, no mgr appreciated  Abdomen: soft, not tender, not distended, audible bowel sound, no hepatosplenomegaly, no palpable masses, no abdominal bruits  Extremities: no cyanosis, clubbing or edema.   Neuro: A, Ox3, no focal signs.  Breast exam in supine and erect position: they are symmetrical, no skin changes, no tenderness or nodes on palpation. Nipples are erect, no skin lesions, no discharge on pressure.    Pelvic exam: deferred, menstruation today        Patient has been advised of split billing requirements and indicates understanding: Yes.  At the check in, at the      Shirley Rico MD  Internal Medicine       ########################################    Preventive Care " Visit  [facility]  Shirley Neema Siu MD, Internal Medicine  Feb 6, 2024        Shreyas Oh is a 33 year old, presenting for the following:  Patient is being seen for an physical.        2/6/2024     9:24 AM   Additional Questions   Roomed by Niecy Garcia        Health Care Directive  Patient does not have a Health Care Directive or Living Will: Discussed advance care planning with patient; however, patient declined at this time.    HPI              2/5/2024   General Health   How would you rate your overall physical health? Good   Feel stress (tense, anxious, or unable to sleep) To some extent   (!) STRESS CONCERN      2/5/2024   Nutrition   Three or more servings of calcium each day? Yes   Diet: Regular (no restrictions)   How many servings of fruit and vegetables per day? (!) 2-3   How many sweetened beverages each day? 0-1         2/5/2024   Exercise   Days per week of moderate/strenous exercise 4 days   Average minutes spent exercising at this level 60 min         2/5/2024   Social Factors   Frequency of gathering with friends or relatives Once a week   Worry food won't last until get money to buy more No   Food not last or not have enough money for food? No   Do you have housing?  Yes   Are you worried about losing your housing? No   Lack of transportation? No   Unable to get utilities (heat,electricity)? No         2/5/2024   Dental   Dentist two times every year? Yes         2/5/2024   TB Screening   Were you born outside of [place]?  No         Today's PHQ-2 Score:       2/5/2024     9:51 AM   PHQ-2 ( 1999 Pf[employer])   Q1: Little interest or pleasure in doing things 1   Q2: Feeling down, depressed or hopeless 1   PHQ-2 Score 2   Q1: Little interest or pleasure in doing things Several days   Q2: Feeling down, depressed or hopeless Several days   PHQ-2 Score 2           2/5/2024   Substance Use   Alcohol more than 3/day or more than 7/wk No   Do you use any other substances  recreationally? (!) ALCOHOL    (!) CANNABIS PRODUCTS     Social History     Tobacco Use    Smoking status: Never     Passive exposure: Never    Smokeless tobacco: Never   Vaping Use    Vaping Use: Never used   Substance Use Topics    Alcohol use: No    Drug use: No             2/5/2024   Breast Cancer Screening   Family history of breast, colon, or ovarian cancer? Yes         2/5/2024   LAST FHS-7 RESULTS   1st degree relative breast or ovarian cancer No   Any relative bilateral breast cancer No   Any male have breast cancer No   Any woman have breast and ovarian cancer No   Any woman with breast cancer before 50yrs No   2 or more relatives with breast and/or ovarian cancer No   2 or more relatives with breast and/or bowel cancer No                  2/5/2024   One time HIV Screening   Previous HIV test? No         2/5/2024   STI Screening   New sexual partner(s) since last STI/HIV test? No     History of abnormal Pap smear:         Latest Ref Rng & Units 8/3/2022     8:48 AM 7/9/2020     9:13 AM 7/9/2020     9:10 AM   PAP / HPV   PAP  Negative for Intraepithelial Lesion or Malignancy (NILM)      PAP (Historical)   NIL     HPV 16 DNA Negative Negative   Negative    HPV 18 DNA Negative Negative   Negative    Other HR HPV Negative Negative   Positive            2/5/2024   Contraception/Family Planning   Questions about contraception or family planning No        Reviewed and updated as needed this visit by Provider                  Signed Electronically by: Shirley Siu MD

## 2024-02-06 NOTE — PATIENT INSTRUCTIONS
Plan:  Schedule in office visit Feb 13 at 09:40  2. Schedule fasting labs the morning of Feb 13   3. You are due for Tetanus booster ( Tdap) in 2025 or sooner if any dirty wound

## 2024-02-27 ENCOUNTER — OFFICE VISIT (OUTPATIENT)
Dept: URGENT CARE | Facility: URGENT CARE | Age: 34
End: 2024-02-27
Payer: COMMERCIAL

## 2024-02-27 VITALS
SYSTOLIC BLOOD PRESSURE: 120 MMHG | DIASTOLIC BLOOD PRESSURE: 81 MMHG | HEART RATE: 88 BPM | OXYGEN SATURATION: 97 % | TEMPERATURE: 98.1 F

## 2024-02-27 DIAGNOSIS — S06.0X0A CONCUSSION WITHOUT LOSS OF CONSCIOUSNESS, INITIAL ENCOUNTER: Primary | ICD-10-CM

## 2024-02-27 DIAGNOSIS — M54.2 CERVICALGIA: ICD-10-CM

## 2024-02-27 PROCEDURE — 99214 OFFICE O/P EST MOD 30 MIN: CPT | Performed by: PHYSICIAN ASSISTANT

## 2024-02-28 NOTE — PROGRESS NOTES
"  Assessment/Plan:    Unremarkable neuro exam, pt does not meet criteria for head CT per [place] head CT rules.   Suspect mild concussion; best healing practices discussed. Continue OTC pain meds as needed.   No tenderness on neck exam; no radicular symptoms. Imaging not indicated. Offered Rx for muscle relaxer, pt declines.     See patient instructions below.    At the end of the encounter, I discussed results, diagnosis, medications. Discussed red flags for immediate return to clinic/ER, as well as indications for follow up if no improvement. Patient understood and agreed to plan. Patient was stable for discharge.      ICD-10-CM    1. Concussion without loss of consciousness, initial encounter  S06.0X0A       2. Cervicalgia  M54.2             Return in about 2 weeks (around 3/12/2024) for Follow up w/ primary care provider if not better.    RASHAUN Rodriguez, [facility]  -----------------------------------------------------------------------------------------------------------------------------------------------------    HPI:  Althea Lopez is a 33 year old female who presents for evaluation of head injury & neck pain 2 days ago. She was playing hockey when she fell to the ground and got her head slammed into the boards. She was wearing a helmet. She denies LOC. She notes mild headache, pressure behind eyes, feeling \"out of it\", and bilateral neck pain. She had nausea right after the injury, but that has resolved. She denies previous hx of head injuries. Ibuprofen helps temporarily. Patient reports no fever/chills, vision changes, dizziness, numbness, weakness, vomiting, or any other symptoms.     Past Medical History:   Diagnosis Date    Allergic rhinitis, cause unspecified     sees allergist for allergy induced asthma.    ASCUS on Pap smear 3/15/11, 3/25/14, 8/13/18    + HPV < 20 yrs of age    ASTHMA UNSPECIFIED 7/27/2004    Frequent UTI     History of " colposcopy with cervical biopsy 10/20/15, 2/10/16, 5/31/19    SAL 2, SAL I, SAL 1    Intermittent asthma     Intermittent asthma     LSIL (low grade squamous intraepithelial lesion) on Pap smear 5/10/12, 9/19/12, 5/15/15, 10/19/15, 2/10/16    HPV 66 High Risk    Moderate major depression (H)      psychiatrist: dr Patricia Reyes       Vitals:    02/27/24 1757   BP: 120/81   Pulse: 88   Temp: 98.1  F (36.7  C)   TempSrc: Tympanic   SpO2: 97%       Physical Exam  Vitals and nursing note reviewed.   HENT:      Head: No raccoon eyes, Young's sign or masses.     Eyes:      Extraocular Movements: Extraocular movements intact.      Conjunctiva/sclera: Conjunctivae normal.   Pulmonary:      Effort: Pulmonary effort is normal.   Musculoskeletal:      Cervical back: No spinous process tenderness or muscular tenderness. Normal range of motion.   Neurological:      Mental Status: She is alert.      GCS: GCS eye subscore is 4. GCS verbal subscore is 5. GCS motor subscore is 6.      Cranial Nerves: Cranial nerves 2-12 are intact.      Sensory: Sensation is intact.      Motor: Motor function is intact.      Coordination: Coordination is intact.      Gait: Gait is intact.       Labs/Imaging:  No results found for this or any previous visit (from the past 24 hour(s)).  No results found for this or any previous visit (from the past 24 hour(s)).        Patient Instructions   We believe a concussion is the cause of your headache and symptoms today.   This is a clinical diagnosis.      For your pain, please use Ibuprofen 600mg every 6 hours as needed for pain.  You may also take Tylenol 500 mg every 4-6 hours as needed for additional pain relief.    Concerning signs and symptoms to watch for over the next 24 hours which would require a prompt return for further evaluation including: severe headache, persistent nausea/vomiting, not acting right, new weakness in any extremity, facial asymmetry, or speech difficulties.      Cognitive rest is  important following a concussion.  Limit anything requiring prolonged concentration. This can include reading, video games, television, or any screen time including text messaging, social media. In addition, the patient should refrain from any contact sports until cleared by their primary care provider.    Coping with Concussion  Concussion is also known as mild traumatic brain injury (MTBI). It is often caused by a blow to the head, or a fall. You may have been unconscious for a few seconds or minutes after the injury. Or maybe you were dazed, confused, or  saw stars.  After this, you thought you were OK. Now, weeks or months later, you re having symptoms that may be caused by a concussion. The good news is that, in most people,  these symptoms will likely go away on their own. Most people with a concussion recover fully, with no need for treatment.     A cold compress can help relieve a headache.    What is a concussion?  A concussion is a mild form of brain injury. In some cases, the effects of a concussion go away within days of the injury. In others, symptoms may continue for a few months. Fortunately, a concussion is temporary. Even when symptoms stay for months, they do go away over time. If they don't, or if your symptoms are worse, contact your healthcare provider.  Symptoms of a concussion  You may have noticed some of these symptoms:  Headaches  Irritability and other changes in behavior  Problems remembering or concentrating  Dizziness or lack of coordination  Fatigue  Problems sleeping  Sensitivity to light and sound  Vision changes  NOTE: If you have severe symptoms or trouble functioning, talk with your healthcare provider right away. If you had a more serious head injury than a concussion, you likely need treatment. Be sure to see your healthcare provider for an evaluation.  What you can do  Since the effects of a concussion go away over time, there isn t a lot you need to do. Be assured that this  problem is temporary. You ll likely have a full recovery. In the meantime, talk with your healthcare provider about ways to relieve any symptoms that are bothering you. These tips may help:  Don't return to sports or any activity that could cause you to hit your head until all symptoms are gone and you have been cleared by your doctor. A second head injury before fully recovering from the first one can lead to serious brain injury.  Stress can make symptoms worse. Help calm yourself by resting in a quiet place and imagining a peaceful scene. Relax your muscles by soaking in a hot bath or taking a hot shower.  If you become dizzy, sit or lie down in a safe place until the sensation passes. Don t drive when you feel dizzy or disoriented.  If you re having trouble sleeping, try to keep a regular sleep schedule. Go to bed and get up at the same time each day. Avoid or limit caffeine and nicotine. Also don't drink alcohol. It may help you sleep at first, but your sleep will not be restful.  Give yourself time to heal. Your recovery will take some time. When you have symptoms, remember that you won t feel this way forever. In time the symptoms will go away and you ll be back to yourself.  If you re not feeling better  The effects of a concussion often go away in 7 to 10 days and the vast majority of people who have had a concussion have recovered after 3 months. If you re not feeling better as time passes, there may be something else going on. If your symptoms don t go away or you notice new ones, talk with your healthcare provider. He or she can help you get the treatment you need.

## 2024-02-28 NOTE — PATIENT INSTRUCTIONS
We believe a concussion is the cause of your headache and symptoms today.   This is a clinical diagnosis.      For your pain, please use Ibuprofen 600mg every 6 hours as needed for pain.  You may also take Tylenol 500 mg every 4-6 hours as needed for additional pain relief.    Concerning signs and symptoms to watch for over the next 24 hours which would require a prompt return for further evaluation including: severe headache, persistent nausea/vomiting, not acting right, new weakness in any extremity, facial asymmetry, or speech difficulties.      Cognitive rest is important following a concussion.  Limit anything requiring prolonged concentration. This can include reading, video games, television, or any screen time including text messaging, social media. In addition, the patient should refrain from any contact sports until cleared by their primary care provider.    Coping with Concussion  Concussion is also known as mild traumatic brain injury (MTBI). It is often caused by a blow to the head, or a fall. You may have been unconscious for a few seconds or minutes after the injury. Or maybe you were dazed, confused, or  saw stars.  After this, you thought you were OK. Now, weeks or months later, you re having symptoms that may be caused by a concussion. The good news is that, in most people,  these symptoms will likely go away on their own. Most people with a concussion recover fully, with no need for treatment.     A cold compress can help relieve a headache.    What is a concussion?  A concussion is a mild form of brain injury. In some cases, the effects of a concussion go away within days of the injury. In others, symptoms may continue for a few months. Fortunately, a concussion is temporary. Even when symptoms stay for months, they do go away over time. If they don't, or if your symptoms are worse, contact your healthcare provider.  Symptoms of a concussion  You may have noticed some of these  symptoms:  Headaches  Irritability and other changes in behavior  Problems remembering or concentrating  Dizziness or lack of coordination  Fatigue  Problems sleeping  Sensitivity to light and sound  Vision changes  NOTE: If you have severe symptoms or trouble functioning, talk with your healthcare provider right away. If you had a more serious head injury than a concussion, you likely need treatment. Be sure to see your healthcare provider for an evaluation.  What you can do  Since the effects of a concussion go away over time, there isn t a lot you need to do. Be assured that this problem is temporary. You ll likely have a full recovery. In the meantime, talk with your healthcare provider about ways to relieve any symptoms that are bothering you. These tips may help:  Don't return to sports or any activity that could cause you to hit your head until all symptoms are gone and you have been cleared by your doctor. A second head injury before fully recovering from the first one can lead to serious brain injury.  Stress can make symptoms worse. Help calm yourself by resting in a quiet place and imagining a peaceful scene. Relax your muscles by soaking in a hot bath or taking a hot shower.  If you become dizzy, sit or lie down in a safe place until the sensation passes. Don t drive when you feel dizzy or disoriented.  If you re having trouble sleeping, try to keep a regular sleep schedule. Go to bed and get up at the same time each day. Avoid or limit caffeine and nicotine. Also don't drink alcohol. It may help you sleep at first, but your sleep will not be restful.  Give yourself time to heal. Your recovery will take some time. When you have symptoms, remember that you won t feel this way forever. In time the symptoms will go away and you ll be back to yourself.  If you re not feeling better  The effects of a concussion often go away in 7 to 10 days and the vast majority of people who have had a concussion have  recovered after 3 months. If you re not feeling better as time passes, there may be something else going on. If your symptoms don t go away or you notice new ones, talk with your healthcare provider. He or she can help you get the treatment you need.

## 2024-03-20 DIAGNOSIS — J45.30 MILD PERSISTENT ASTHMA WITHOUT COMPLICATION: ICD-10-CM

## 2024-03-20 RX ORDER — FLUTICASONE PROPIONATE AND SALMETEROL XINAFOATE 230; 21 UG/1; UG/1
2 AEROSOL, METERED RESPIRATORY (INHALATION) 2 TIMES DAILY
Qty: 12 G | Refills: 0 | Status: SHIPPED | OUTPATIENT
Start: 2024-03-20 | End: 2024-04-30

## 2024-04-14 ENCOUNTER — OFFICE VISIT (OUTPATIENT)
Dept: URGENT CARE | Facility: URGENT CARE | Age: 34
End: 2024-04-14
Payer: COMMERCIAL

## 2024-04-14 VITALS
SYSTOLIC BLOOD PRESSURE: 110 MMHG | DIASTOLIC BLOOD PRESSURE: 72 MMHG | WEIGHT: 179 LBS | TEMPERATURE: 101.5 F | RESPIRATION RATE: 18 BRPM | BODY MASS INDEX: 32.74 KG/M2 | OXYGEN SATURATION: 100 % | HEART RATE: 99 BPM

## 2024-04-14 DIAGNOSIS — R50.9 FEVER AND CHILLS: ICD-10-CM

## 2024-04-14 DIAGNOSIS — J11.1 INFLUENZA-LIKE ILLNESS: Primary | ICD-10-CM

## 2024-04-14 DIAGNOSIS — R07.0 THROAT PAIN: ICD-10-CM

## 2024-04-14 LAB
DEPRECATED S PYO AG THROAT QL EIA: NEGATIVE
FLUAV AG SPEC QL IA: NEGATIVE
FLUBV AG SPEC QL IA: NEGATIVE

## 2024-04-14 PROCEDURE — 87804 INFLUENZA ASSAY W/OPTIC: CPT | Performed by: PHYSICIAN ASSISTANT

## 2024-04-14 PROCEDURE — 99213 OFFICE O/P EST LOW 20 MIN: CPT | Performed by: PHYSICIAN ASSISTANT

## 2024-04-14 PROCEDURE — 87651 STREP A DNA AMP PROBE: CPT | Performed by: PHYSICIAN ASSISTANT

## 2024-04-14 PROCEDURE — 87635 SARS-COV-2 COVID-19 AMP PRB: CPT | Performed by: PHYSICIAN ASSISTANT

## 2024-04-14 RX ORDER — OSELTAMIVIR PHOSPHATE 75 MG/1
75 CAPSULE ORAL 2 TIMES DAILY
Qty: 10 CAPSULE | Refills: 0 | Status: SHIPPED | OUTPATIENT
Start: 2024-04-14 | End: 2024-04-19

## 2024-04-14 NOTE — PROGRESS NOTES
Assessment & Plan     Influenza-like illness  Acute problem.  On exam patient is in no acute distress.  Patient has asthma at baseline.  Tamiflu is prescribed today.  Patient educational information provided regarding course of symptoms.  Recommended rest, fluids.  Advised to keep monitoring symptoms.  Follow-up if any worsening symptoms.  Patient agrees.  - oseltamivir (TAMIFLU) 75 MG capsule  Dispense: 10 capsule; Refill: 0      Throat pain  Rapid strep is negative today.  Throat culture is pending.  No evidence of peritonsillar abscess.  Supportive care measures advised: tylenol, motrin, throat lozenges.  We will communicate any positive finding on the throat culture result.  Follow-up if any worsening symptoms.  Patient understands and agrees with the plan.     - Streptococcus A Rapid Screen w/Reflex to PCR - Clinic Collect  - Group A Streptococcus PCR Throat Swab  - Symptomatic COVID-19 Virus (Coronavirus) by PCR Nose    Fever and chills  In the setting of influenza-like illness.  Influenza test is negative today.  However still suspect influenza like illness.  COVID PCR test is pending.  Patient will be notified of the results via [employer].  Recommend Tylenol/ Motrin as needed for fever.  Push fluids and rest.  Will anticipate gradual improvement.  Follow-up if any worsening symptoms.  Patient agrees with the plan.  - Influenza A & B Antigen - Clinic Collect  - Symptomatic COVID-19 Virus (Coronavirus) by PCR Nose       Return in about 5 days (around 4/19/2024) for Symptoms failing to improve.    CHADWICK Motley [facility] DOM Oh is a 33 year old female who presents to clinic today for the following health issues:  Chief Complaint   Patient presents with    Urgent Care    URI     Sore throat, fever, congestion HA and body aches X 1 day  Bilateral ear pain x this morning      HPI    Patient is presenting to urgent care today with complaint of sore throat,  fever, congestion, headache, body aches, ear pain.  Onset of symptoms yesterday.  No vomiting or diarrhea.  No cough.  No known sick contacts.  Treatment tried: None.  Medical History significant for asthma.      Review of Systems  Constitutional, HEENT, cardiovascular, pulmonary, GI, , musculoskeletal, neuro, skin, endocrine and psych systems are negative, except as otherwise noted.      Objective    /72 (BP Location: Right arm, Patient Position: Sitting, Cuff Size: Adult Large)   Pulse 99   Temp (!) 101.5  F (38.6  C) (Tympanic)   Resp 18   Wt 81.2 kg (179 lb)   SpO2 100%   Breastfeeding No   BMI 32.74 kg/m    Physical Exam   GENERAL: alert and no distress  HENT: ear canals and TM's normal, mouth without ulcers or lesions, throat is moist and pink, uvula is midline  RESP: lungs clear to auscultation - no rales, rhonchi or wheezes  CV: regular rate and rhythm, normal S1 S2  MS: no gross musculoskeletal defects noted, no edema  SKIN: no suspicious lesions or rashes    Results for orders placed or performed in visit on 04/14/24 (from the past 24 hour(s))   Streptococcus A Rapid Screen w/Reflex to PCR - Clinic Collect    Specimen: Throat; Swab   Result Value Ref Range    Group A Strep antigen Negative Negative   Influenza A & B Antigen - Clinic Collect    Specimen: Nose; Swab   Result Value Ref Range    Influenza A antigen Negative Negative    Influenza B antigen Negative Negative    Narrative    Test results must be correlated with clinical data. If necessary, results should be confirmed by a molecular assay or viral culture.

## 2024-04-15 ENCOUNTER — NURSE TRIAGE (OUTPATIENT)
Dept: INTERNAL MEDICINE | Facility: CLINIC | Age: 34
End: 2024-04-15

## 2024-04-15 ENCOUNTER — OFFICE VISIT (OUTPATIENT)
Dept: INTERNAL MEDICINE | Facility: CLINIC | Age: 34
End: 2024-04-15
Payer: COMMERCIAL

## 2024-04-15 ENCOUNTER — E-VISIT (OUTPATIENT)
Dept: INTERNAL MEDICINE | Facility: CLINIC | Age: 34
End: 2024-04-15
Payer: COMMERCIAL

## 2024-04-15 ENCOUNTER — MYC MEDICAL ADVICE (OUTPATIENT)
Dept: INTERNAL MEDICINE | Facility: CLINIC | Age: 34
End: 2024-04-15

## 2024-04-15 VITALS
WEIGHT: 182 LBS | BODY MASS INDEX: 33.49 KG/M2 | SYSTOLIC BLOOD PRESSURE: 128 MMHG | DIASTOLIC BLOOD PRESSURE: 69 MMHG | TEMPERATURE: 98.6 F | RESPIRATION RATE: 18 BRPM | HEIGHT: 62 IN | HEART RATE: 81 BPM | OXYGEN SATURATION: 98 %

## 2024-04-15 DIAGNOSIS — R30.0 DYSURIA: Primary | ICD-10-CM

## 2024-04-15 DIAGNOSIS — R30.0 DIFFICULT OR PAINFUL URINATION: ICD-10-CM

## 2024-04-15 DIAGNOSIS — N30.90 CYSTITIS: Primary | ICD-10-CM

## 2024-04-15 LAB
ALBUMIN UR-MCNC: NEGATIVE MG/DL
APPEARANCE UR: CLEAR
BACTERIA #/AREA URNS HPF: ABNORMAL /HPF
BILIRUB UR QL STRIP: NEGATIVE
COLOR UR AUTO: YELLOW
GLUCOSE UR STRIP-MCNC: 100 MG/DL
GROUP A STREP BY PCR: NOT DETECTED
HGB UR QL STRIP: ABNORMAL
KETONES UR STRIP-MCNC: NEGATIVE MG/DL
LEUKOCYTE ESTERASE UR QL STRIP: ABNORMAL
NITRATE UR QL: NEGATIVE
PH UR STRIP: 6.5 [PH] (ref 5–7)
RBC #/AREA URNS AUTO: ABNORMAL /HPF
SARS-COV-2 RNA RESP QL NAA+PROBE: POSITIVE
SP GR UR STRIP: 1.01 (ref 1–1.03)
SQUAMOUS #/AREA URNS AUTO: ABNORMAL /LPF
UROBILINOGEN UR STRIP-ACNC: 0.2 E.U./DL
WBC #/AREA URNS AUTO: ABNORMAL /HPF

## 2024-04-15 PROCEDURE — 87086 URINE CULTURE/COLONY COUNT: CPT | Performed by: PHYSICIAN ASSISTANT

## 2024-04-15 PROCEDURE — 87088 URINE BACTERIA CULTURE: CPT | Performed by: PHYSICIAN ASSISTANT

## 2024-04-15 PROCEDURE — 81001 URINALYSIS AUTO W/SCOPE: CPT | Performed by: PHYSICIAN ASSISTANT

## 2024-04-15 PROCEDURE — 99213 OFFICE O/P EST LOW 20 MIN: CPT | Performed by: PHYSICIAN ASSISTANT

## 2024-04-15 PROCEDURE — 99207 PR NO BILLABLE SERVICE THIS VISIT: CPT | Performed by: INTERNAL MEDICINE

## 2024-04-15 RX ORDER — SULFAMETHOXAZOLE/TRIMETHOPRIM 800-160 MG
1 TABLET ORAL 2 TIMES DAILY
Qty: 6 TABLET | Refills: 0 | Status: SHIPPED | OUTPATIENT
Start: 2024-04-15 | End: 2024-04-18

## 2024-04-15 NOTE — TELEPHONE ENCOUNTER
Triaged and schedule patient for appointment.     Appointments in Next Year      Apr 15, 2024  9:00 AM  (Arrive by 8:40 AM)  Provider Visit with Mindy Sterling PA-C  [facility] ([facility] ) [phone]           Isabela MARTINEZ

## 2024-04-15 NOTE — TELEPHONE ENCOUNTER
"Nurse Triage SBAR    Is this a 2nd Level Triage? YES, LICENSED PRACTITIONER REVIEW IS REQUIRED    Situation: patient reports urinary symptoms and lower back pain     Background: Received following [employer] message \"I went to urgent care yesterday with body aches, sore throat, fever, and ear pain. While I have these symptoms and they have lessened, I brought up the concern of a bladder or kidney infection. I have frequently gotten UTI s over the years and I think I was ignoring this one. I recently traveled back from [place] and took cranberry pills to help with some mild pain. Yesterday, the symptoms worsened. I ve been drinking lots of fluids, probably 20 ounces of water every hour and a half and urinating infrequently, probably 3-4 times a day total. It is cloudy and somewhat difficult to urinate. The lower back pain feels very similar to a bladder infection I ve had in the past and the fever coincides with the symptoms. Additionally, I feel very thirsty and dehydrated despite the increased fluid intake. I was at urgent care for two and a half hours yesterday and I feel very poorly and was hoping I could directly schedule a visit to investigate my concerns. \"    Assessment: Lower back pain- bilateral, no urgency, no blood in urine but cloudy, pain with urination- doesn't burn but bladder hurts. 100.8 fever, no abdominal pain. Pain present at all times. Couple days ago- has worsened. Has had a UTI in the past, this pain feels worse. Denies pregnancy. Able to urinate 3-4x a day. Denies vomiting or nausea.     Protocol Recommended Disposition:   Go To Office Now    Recommendation: Scheduled patient for appointment today   Appointments in Next Year      Apr 15, 2024  9:00 AM  (Arrive by 8:40 AM)  Provider Visit with Mindy Sterling PA-C  [facility] ([facility] ) [phone]                Does the patient meet one of the following criteria for ADS visit " consideration? 16+ years old, with an [facility] PCP     TIP  Providers, please consider if this condition is appropriate for management at one of our Acute and Diagnostic Services sites.     If patient is a good candidate, please use dotphrase <dot>triageresponse and select Refer to ADS to document.  Reason for Disposition   Fever > 100.4 F (38.0 C)    Additional Information   Negative: Shock suspected (e.g., cold/pale/clammy skin, too weak to stand, low BP, rapid pulse)   Negative: Sounds like a life-threatening emergency to the triager   Negative: Taking antibiotic for urinary tract infection (UTI)   Negative: Unable to urinate (or only a few drops) and bladder feels very full   Negative: Vomiting   Negative: Patient sounds very sick or weak to the triager   Negative: SEVERE pain with urination    Protocols used: Urination Pain - Female-A-OH

## 2024-04-15 NOTE — PATIENT INSTRUCTIONS
I am sorry you don't feel so well.  I sent prescription for Bactrim - antibiotic to your pharmacy.  It would be better if you can stop at any [facility] lab to leave an urine sample BEFORE you start taking the antibiotic.  Make sure you drink plenty of fluids   I hope you feel better sone    Sincerely,    Shirley Rico MD  Internal Medicine

## 2024-04-15 NOTE — PROGRESS NOTES
Assessment & Plan     Dysuria  No CVA tenderness. Recent fevers, but has URI symptoms; COVID test pending. Differential for urinary symptoms includes cystitis, pyelonephritis, urethritis.   Urine culture pending. Start Bactrim. Continue with hydration. Recommend urgent evaluation if she develops vomiting, high fevers, or flank pain.  - UA Macroscopic with reflex to Microscopic and Culture - Clinic Collect  - UA Microscopic with Reflex to Culture  - sulfamethoxazole-trimethoprim (BACTRIM DS) 800-160 MG tablet; Take 1 tablet by mouth 2 times daily for 3 days  - Urine Culture Aerobic Bacterial - lab collect; Future  - Urine Culture Aerobic Bacterial - lab collect    Prescription drug management    Shreyas Oh is a 33 year old, presenting for the following health issues:  Dysuria (Last Ibuprofen was today at about 8AM)    HPI     Pre-Provider Visit Orders- Urinalysis UA/UC  Patient reports the following symptoms:  possible urinary tract infection (UTI)   Does the patient report any of the following symptoms: vaginal discharge, vaginal itching, possible yeast infection, has a urinary catheter in place, or unable to void in a specimen cup?  No          Genitourinary - Female  Onset/Duration: a few days ago  Description:   Painful urination (Dysuria): YES           Frequency: No  Blood in urine (Hematuria): cloudy  Delay in urine (Hesitency): No  Intensity: moderate  Progression of Symptoms:  worsening  Accompanying Signs & Symptoms:  Fever/chills: YES, 101.5 yday  Flank pain: YES  Nausea and vomiting: No  Vaginal symptoms: none  Abdominal/Pelvic Pain: YES  History:   History of frequent UTI s: YES  History of kidney stones: No  Sexually Active: YES  Possibility of pregnancy: Don't Know  Precipitating or alleviating factors: None  Therapies tried and outcome: Cranberry juice prn (contraindicated in Coumadin patients)     No urgency  Has been drinking a lot of water and did not experience a corresponding increase  "in urination    Reports h/o frequent UTIs. Has had pyelonephritis in the past. Says this resembles a bladder infection    No concern for STIs    Concurrent URI symptoms. Seen in UC yesterday (strep testing negative, influenza testing negative, COVID test in progress)    Sore throat better  Still having nasal symptoms  No cough  Ears feeling better  URI symptoms since Sat night  Has not started taking Tamiflu prescribed by     Past Medical History:   Diagnosis Date    Allergic rhinitis, cause unspecified     sees allergist for allergy induced asthma.    ASCUS on Pap smear 3/15/11, 3/25/14, 8/13/18    + HPV < 20 yrs of age    ASTHMA UNSPECIFIED 7/27/2004    Frequent UTI     History of colposcopy with cervical biopsy 10/20/15, 2/10/16, 5/31/19    SAL 2, SAL I, SAL 1    Intermittent asthma     Intermittent asthma     LSIL (low grade squamous intraepithelial lesion) on Pap smear 5/10/12, 9/19/12, 5/15/15, 10/19/15, 2/10/16    HPV 66 High Risk    Moderate major depression (H)      psychiatrist: dr Patricia Reyes           Review of Systems  Constitutional, HEENT, cardiovascular, pulmonary, gi and gu systems are negative, except as otherwise noted.      Objective    /69   Pulse 81   Temp 98.6  F (37  C)   Resp 18   Ht 1.575 m (5' 2\")   Wt 82.6 kg (182 lb)   SpO2 98%   Breastfeeding No   BMI 33.29 kg/m    Body mass index is 33.29 kg/m .  Physical Exam   GENERAL: alert and no distress  EYES: Eyes grossly normal to inspection, PERRL and conjunctivae and sclerae normal  RESP: lungs clear to auscultation - no rales, rhonchi or wheezes  CV: regular rate and rhythm, normal S1 S2, no S3 or S4, no murmur, click or rub, no peripheral edema  ABDOMEN: tenderness suprapubic  BACK: no CVA tenderness    Results for orders placed or performed in visit on 04/15/24 (from the past 24 hour(s))   UA Macroscopic with reflex to Microscopic and Culture - Clinic Collect    Specimen: Urine, Midstream   Result Value Ref Range    Color " Urine Yellow Colorless, Straw, Light Yellow, Yellow    Appearance Urine Clear Clear    Glucose Urine 100 (A) Negative mg/dL    Bilirubin Urine Negative Negative    Ketones Urine Negative Negative mg/dL    Specific Gravity Urine 1.015 1.003 - 1.035    Blood Urine Moderate (A) Negative    pH Urine 6.5 5.0 - 7.0    Protein Albumin Urine Negative Negative mg/dL    Urobilinogen Urine 0.2 0.2, 1.0 E.U./dL    Nitrite Urine Negative Negative    Leukocyte Esterase Urine Trace (A) Negative   UA Microscopic with Reflex to Culture   Result Value Ref Range    Bacteria Urine Few (A) None Seen /HPF    RBC Urine 0-2 0-2 /HPF /HPF    WBC Urine 0-5 0-5 /HPF /HPF    Squamous Epithelials Urine Moderate (A) None Seen /LPF    Narrative    Urine Culture not indicated           Signed Electronically by: Mindy Sterling PA-C

## 2024-04-16 ENCOUNTER — TELEPHONE (OUTPATIENT)
Dept: NURSING | Facility: CLINIC | Age: 34
End: 2024-04-16
Payer: COMMERCIAL

## 2024-04-16 LAB
BACTERIA UR CULT: ABNORMAL
BACTERIA UR CULT: ABNORMAL

## 2024-04-16 NOTE — TELEPHONE ENCOUNTER
Coronavirus (COVID-19) Notification    Caller Name (Patient, parent, daughter/son, grandparent, etc)  patient    Reason for call  Notify of Positive Coronavirus (COVID-19) lab results, assess symptoms,  review [facility] recommendations    Lab Result    Lab test:  2019-nCoV rRt-PCR or SARS-CoV-2 PCR    Oropharyngeal AND/OR nasopharyngeal swabs is POSITIVE for 2019-nCoV RNA/SARS-COV-2 PCR (COVID-19 virus)      Gather patient reported symptoms   Assessment   Current Symptoms at time of phone call, reported by patient: (if no symptoms, document: No symptoms] congestion   Date of symptom(s) onset (if applicable) 4/13     If at time of call, Patients symptoms have worsened, the Patient should contact 911 or have someone drive them to Emergency Dept promptly:    If Patient calling 911, inform 911 personal that you have tested positive for the Coronavirus (COVID-19).  Place mask on and await 911 to arrive.  If Emergency Dept, If possible, please have another adult drive you to the Emergency Dept but you need to wear mask when in contact with other people.      Treatment Options:   Is patient interested in discussing COVID treatment? No.        Review information with Patient    Your result was positive. This means you have COVID-19 (coronavirus).    How can I protect others?    These guidelines are for isolating before returning to work, school or .    If you DO have symptoms  Stay home and away from others   For at least 5 days after your symptoms started, AND  You are fever free for 24 hours (with no medicine that reduces fever), AND  Your other symptoms are better  Wear a mask for 10 full days anytime you are around others    If you DON'T have symptoms  Stay home and away from others for at least 5 days after your positive test  Wear a mask for 10 full days anytime you are around others    There may be different guidelines for healthcare facilities.  Please check with the specific sites before  arriving.    If you have been told by a doctor that you were severely ill with COVID-19 or are immunocompromised, you should isolate for at least 10 days.    You should not go back to work until you meet the guidelines above for ending your home isolation. You don't need to be retested for COVID-19 before going back to work--studies show that you won't spread the virus if it's been at least 10 days since your symptoms started (or 20 days, if you have a weak immune system).    Employers, schools, and daycares: This is an official notice for this person's medical guidelines for returning in-person.  They must meet the above guidelines before going back to work, school or  in person.    You will receive a positive COVID-19 letter via [employer] or the mail soon with additional self-care information.    Would you like me to review some of that information with you now?  No    If you were tested for an upcoming procedure, please contact your provider for next steps.    Lucrecia Westbrook

## 2024-04-30 DIAGNOSIS — J45.30 MILD PERSISTENT ASTHMA WITHOUT COMPLICATION: ICD-10-CM

## 2024-04-30 RX ORDER — FLUTICASONE PROPIONATE AND SALMETEROL XINAFOATE 230; 21 UG/1; UG/1
2 AEROSOL, METERED RESPIRATORY (INHALATION) 2 TIMES DAILY
Qty: 12 G | Refills: 0 | Status: SHIPPED | OUTPATIENT
Start: 2024-04-30 | End: 2024-06-28

## 2024-06-13 ENCOUNTER — E-VISIT (OUTPATIENT)
Dept: URGENT CARE | Facility: CLINIC | Age: 34
End: 2024-06-13
Payer: COMMERCIAL

## 2024-06-13 ENCOUNTER — LAB (OUTPATIENT)
Dept: LAB | Facility: CLINIC | Age: 34
End: 2024-06-13
Payer: COMMERCIAL

## 2024-06-13 DIAGNOSIS — R30.0 DYSURIA: Primary | ICD-10-CM

## 2024-06-13 DIAGNOSIS — R30.0 DYSURIA: ICD-10-CM

## 2024-06-13 LAB
ALBUMIN UR-MCNC: NEGATIVE MG/DL
APPEARANCE UR: CLEAR
BACTERIA #/AREA URNS HPF: ABNORMAL /HPF
BILIRUB UR QL STRIP: NEGATIVE
COLOR UR AUTO: YELLOW
GLUCOSE UR STRIP-MCNC: NEGATIVE MG/DL
HGB UR QL STRIP: NEGATIVE
KETONES UR STRIP-MCNC: NEGATIVE MG/DL
LEUKOCYTE ESTERASE UR QL STRIP: NEGATIVE
NITRATE UR QL: POSITIVE
PH UR STRIP: 6.5 [PH] (ref 5–7)
RBC #/AREA URNS AUTO: ABNORMAL /HPF
SP GR UR STRIP: <=1.005 (ref 1–1.03)
SQUAMOUS #/AREA URNS AUTO: ABNORMAL /LPF
UROBILINOGEN UR STRIP-ACNC: 0.2 E.U./DL
WBC #/AREA URNS AUTO: ABNORMAL /HPF

## 2024-06-13 PROCEDURE — 87086 URINE CULTURE/COLONY COUNT: CPT

## 2024-06-13 PROCEDURE — 99421 OL DIG E/M SVC 5-10 MIN: CPT | Performed by: FAMILY MEDICINE

## 2024-06-13 PROCEDURE — 81001 URINALYSIS AUTO W/SCOPE: CPT

## 2024-06-13 NOTE — PATIENT INSTRUCTIONS
Dear Althea Lopez,     After reviewing your responses, I would like you to come in for a urine test to make sure we treat you correctly. This urine test is to evaluate you for a possible urinary tract infection, and should be scheduled for today or tomorrow. Schedule a Lab Only appointment here.     Lab appointments are not available at most locations on the weekends. If no Lab Only appointment is available, you should be seen in any of our convenient Walk-in or Urgent Care Centers, which can be found on our website here.     You will receive instructions with your results in [facility] once they are available.     If your symptoms worsen, you develop pain in your back or stomach, develop fevers, or are not improving in 5 days, please contact your primary care provider for an appointment or visit a Walk-in or Urgent Care Center to be seen.     Thanks again for choosing us as your health care partner,     ANA ZEE CNP

## 2024-06-15 LAB — BACTERIA UR CULT: NO GROWTH

## 2024-06-28 DIAGNOSIS — J45.30 MILD PERSISTENT ASTHMA WITHOUT COMPLICATION: ICD-10-CM

## 2024-06-28 RX ORDER — FLUTICASONE PROPIONATE AND SALMETEROL XINAFOATE 230; 21 UG/1; UG/1
2 AEROSOL, METERED RESPIRATORY (INHALATION) 2 TIMES DAILY
Qty: 12 G | Refills: 0 | Status: SHIPPED | OUTPATIENT
Start: 2024-06-28

## 2024-06-28 NOTE — TELEPHONE ENCOUNTER
Triage RN receives message below when trying to send medication refill to the pharmacy...        Will route to the provider to see if an alternative medication should be ordered.     Thank you,  Apple Arauz, RN

## 2024-07-31 ENCOUNTER — TRANSFERRED RECORDS (OUTPATIENT)
Dept: HEALTH INFORMATION MANAGEMENT | Facility: CLINIC | Age: 34
End: 2024-07-31
Payer: COMMERCIAL

## 2024-10-31 ENCOUNTER — OFFICE VISIT (OUTPATIENT)
Dept: OPTOMETRY | Facility: CLINIC | Age: 34
End: 2024-10-31
Payer: COMMERCIAL

## 2024-10-31 DIAGNOSIS — H52.13 MYOPIA OF BOTH EYES: ICD-10-CM

## 2024-10-31 DIAGNOSIS — Z01.01 ENCOUNTER FOR EXAMINATION OF EYES AND VISION WITH ABNORMAL FINDINGS: Primary | ICD-10-CM

## 2024-10-31 DIAGNOSIS — Z98.890 S/P LASIK SURGERY: ICD-10-CM

## 2024-10-31 PROCEDURE — 92015 DETERMINE REFRACTIVE STATE: CPT | Performed by: OPTOMETRIST

## 2024-10-31 PROCEDURE — 92014 COMPRE OPH EXAM EST PT 1/>: CPT | Performed by: OPTOMETRIST

## 2024-10-31 ASSESSMENT — CONF VISUAL FIELD
OS_NORMAL: 1
METHOD: COUNTING FINGERS
OS_INFERIOR_NASAL_RESTRICTION: 0
OD_INFERIOR_TEMPORAL_RESTRICTION: 0
OD_SUPERIOR_TEMPORAL_RESTRICTION: 0
OS_SUPERIOR_NASAL_RESTRICTION: 0
OD_SUPERIOR_NASAL_RESTRICTION: 0
OS_INFERIOR_TEMPORAL_RESTRICTION: 0
OD_NORMAL: 1
OD_INFERIOR_NASAL_RESTRICTION: 0
OS_SUPERIOR_TEMPORAL_RESTRICTION: 0

## 2024-10-31 ASSESSMENT — SLIT LAMP EXAM - LIDS
COMMENTS: NORMAL
COMMENTS: NORMAL

## 2024-10-31 ASSESSMENT — TONOMETRY
IOP_METHOD: APPLANATION
OS_IOP_MMHG: 16
OD_IOP_MMHG: 15

## 2024-10-31 ASSESSMENT — EXTERNAL EXAM - LEFT EYE: OS_EXAM: NORMAL

## 2024-10-31 ASSESSMENT — REFRACTION_MANIFEST
OD_CYLINDER: SPHERE
OS_CYLINDER: SPHERE
OD_SPHERE: -0.75
OS_SPHERE: -1.00

## 2024-10-31 ASSESSMENT — VISUAL ACUITY
OS_SC: 20/20
OS_SC: 20/30
OD_SC: 20/30
OS_SC+: -1
OD_SC+: -1
METHOD: SNELLEN - LINEAR
OD_SC: 20/20

## 2024-10-31 ASSESSMENT — CUP TO DISC RATIO
OD_RATIO: 0.4
OS_RATIO: 0.4

## 2024-10-31 ASSESSMENT — EXTERNAL EXAM - RIGHT EYE: OD_EXAM: NORMAL

## 2024-10-31 NOTE — PROGRESS NOTES
Chief Complaint   Patient presents with    Annual Eye Exam      -S/P Lasik each eye 2021 - [place]     -Family hx of glaucoma - mat gma    -Family hx of AMD - pat gpa      Last Eye Exam: 11/16/2022   Dilated Previously: Yes, side effects of dilation explained today - would like to discuss - lives ~1 hr away and is concerned about driving     What are you currently using to see?  does not use glasses or contacts        Distance Vision Acuity: Noticed gradual change in both eyes and More trouble with glare at night     Near Vision Acuity: Not satisfied - increase in eye fatigue/headaches     Eye Comfort: good overall - occasionally dry   Do you use eye drops? : Yes: Blink PRN   Occupation or Hobbies:  - [employer] - 4-6 hrs/day on screen     Natalie Antonio  Optometry Assistant        Medical, surgical and family histories reviewed and updated 10/31/2024.       OBJECTIVE: See Ophthalmology exam    ASSESSMENT:    ICD-10-CM    1. Encounter for examination of eyes and vision with abnormal findings  Z01.01       2. S/P LASIK surgery  Z98.890       3. Myopia of both eyes  H52.13           PLAN:     Patient Instructions   Updated glasses prescription provided today.   Allow 2 weeks to adapt to the new glasses.   Wear glasses as needed.     Return in 2 years for a comprehensive eye exam, or sooner if needed.      The effects of the dilating drops last for 4- 6 hours.  You will be more sensitive to light and vision will be blurry up close.  Mydriatic sunglasses were given if needed.     Benjamin Swenson, OD  [facility]  [address] [place] [address]. [place]  [place]  [id number]    [phone]

## 2024-10-31 NOTE — PATIENT INSTRUCTIONS
Updated glasses prescription provided today.   Allow 2 weeks to adapt to the new glasses.   Wear glasses as needed.     Return in 2 years for a comprehensive eye exam, or sooner if needed.      The effects of the dilating drops last for 4- 6 hours.  You will be more sensitive to light and vision will be blurry up close.  Mydriatic sunglasses were given if needed.     Benjamin Swenson, OD  [facility]  [address] [place] [address]. [place]  [place]  [id number]    [phone]

## 2024-10-31 NOTE — LETTER
10/31/2024      Althea Lopez  [address] [facility] [address]      Dear Colleague,    Thank you for referring your patient, Althea Lopez, to the [facility]. Please see a copy of my visit note below.    Chief Complaint   Patient presents with     Annual Eye Exam      -S/P Lasik each eye 2021 - [place]     -Family hx of glaucoma - mat gma    -Family hx of AMD - pat gpa      Last Eye Exam: 11/16/2022   Dilated Previously: Yes, side effects of dilation explained today - would like to discuss - lives ~1 hr away and is concerned about driving     What are you currently using to see?  does not use glasses or contacts        Distance Vision Acuity: Noticed gradual change in both eyes and More trouble with glare at night     Near Vision Acuity: Not satisfied - increase in eye fatigue/headaches     Eye Comfort: good overall - occasionally dry   Do you use eye drops? : Yes: Blink PRN   Occupation or Hobbies:  - [employer] - 4-6 hrs/day on screen     Natalie Antonio  Optometry Assistant        Medical, surgical and family histories reviewed and updated 10/31/2024.       OBJECTIVE: See Ophthalmology exam    ASSESSMENT:    ICD-10-CM    1. Encounter for examination of eyes and vision with abnormal findings  Z01.01       2. S/P LASIK surgery  Z98.890       3. Myopia of both eyes  H52.13           PLAN:     Patient Instructions   Updated glasses prescription provided today.   Allow 2 weeks to adapt to the new glasses.   Wear glasses as needed.     Return in 2 years for a comprehensive eye exam, or sooner if needed.      The effects of the dilating drops last for 4- 6 hours.  You will be more sensitive to light and vision will be blurry up close.  Mydriatic sunglasses were given if needed.     Benjamin Swenson, OD  [facility]  [address] [facility]. [place]  [id number]    [phone]       Again, thank you for allowing me to  participate in the care of your patient.        Sincerely,        Benjamin Swenson OD

## 2024-12-04 ENCOUNTER — OFFICE VISIT (OUTPATIENT)
Dept: URGENT CARE | Facility: URGENT CARE | Age: 34
End: 2024-12-04
Payer: COMMERCIAL

## 2024-12-04 ENCOUNTER — ANCILLARY PROCEDURE (OUTPATIENT)
Dept: GENERAL RADIOLOGY | Facility: CLINIC | Age: 34
End: 2024-12-04
Attending: PHYSICIAN ASSISTANT
Payer: COMMERCIAL

## 2024-12-04 VITALS
BODY MASS INDEX: 31.09 KG/M2 | HEART RATE: 85 BPM | SYSTOLIC BLOOD PRESSURE: 112 MMHG | DIASTOLIC BLOOD PRESSURE: 72 MMHG | WEIGHT: 170 LBS | TEMPERATURE: 99.1 F | OXYGEN SATURATION: 98 %

## 2024-12-04 DIAGNOSIS — R50.9 FEVER IN ADULT: ICD-10-CM

## 2024-12-04 DIAGNOSIS — R05.8 PRODUCTIVE COUGH: ICD-10-CM

## 2024-12-04 DIAGNOSIS — J45.40 MODERATE PERSISTENT ASTHMATIC BRONCHITIS WITHOUT COMPLICATION: Primary | ICD-10-CM

## 2024-12-04 PROCEDURE — 71046 X-RAY EXAM CHEST 2 VIEWS: CPT | Mod: TC | Performed by: RADIOLOGY

## 2024-12-04 PROCEDURE — 99213 OFFICE O/P EST LOW 20 MIN: CPT | Performed by: PHYSICIAN ASSISTANT

## 2024-12-04 RX ORDER — BENZONATATE 200 MG/1
200 CAPSULE ORAL 3 TIMES DAILY PRN
Qty: 30 CAPSULE | Refills: 0 | Status: SHIPPED | OUTPATIENT
Start: 2024-12-04

## 2024-12-04 RX ORDER — AZITHROMYCIN 250 MG/1
TABLET, FILM COATED ORAL
Qty: 6 TABLET | Refills: 0 | Status: SHIPPED | OUTPATIENT
Start: 2024-12-04 | End: 2024-12-09

## 2024-12-04 NOTE — PROGRESS NOTES
Assessment & Plan     Moderate persistent asthmatic bronchitis without complication  Fortunately today chest x-ray is negative for acute infiltrates per my read, final radiologist read is pending.  We have elected to treat for presumed bacterial bronchitis given length and worsening of symptoms.  Zithromax is prescribed.  Tessalon Perles prescribed as needed for cough.  Continue daily inhaler as scheduled.  Close monitoring of symptoms.  Follow-up if any worsening symptoms.  Patient agrees with the plan.  - azithromycin (ZITHROMAX) 250 MG tablet  Dispense: 6 tablet; Refill: 0  - benzonatate (TESSALON) 200 MG capsule  Dispense: 30 capsule; Refill: 0    Productive cough  - XR Chest 2 Views    Fever in adult  Tylenol/Motrin as needed.  - XR Chest 2 Views       Return in about 1 week (around 12/11/2024) for Symptoms failing to improve.    Chioma Thrasher PA-C  [facility] Oh is a 34 year old female who presents to clinic today for the following health issues:  Chief Complaint   Patient presents with    Urgent Care     X2  weeks chest congestion, sinus concern, got better now getting worse since Saturday, throat pain, coughing, waking up at night coughing, mild productive cough, mild runny nose, low grade temps, mild body aches,  covid negative at home,   Mother has pneumonia ,        HPI      URI Adult    Onset of symptoms was 2 week(s) ago.  Course of illness is worsening.    Severity moderate  Current and Associated symptoms: cough - productive, nasal congestion, fever, ST  No v/d  Treatment measures tried include OTC Cough med.  Predisposing factors include exposure to pneumonia, Hx of asthma.  Negative home covid test      Review of Systems  Constitutional, HEENT, cardiovascular, pulmonary, GI, , musculoskeletal, neuro, skin, endocrine and psych systems are negative, except as otherwise noted.      Objective    /72   Pulse 85   Temp 99.1  F (37.3  C)  (Tympanic)   Wt 77.1 kg (170 lb)   SpO2 98%   BMI 31.09 kg/m    Physical Exam   GENERAL: alert and no distress  HENT: ear canals and TM's normal, mouth without ulcers or lesions, throat is moist and pink  RESP: lungs with coarse breath sounds over the lung fields  CV: regular rate and rhythm, normal S1 S2  MS: no gross musculoskeletal defects noted, no edema  SKIN: no suspicious lesions or rashes    CXR - Reviewed and interpreted by me Normal- no infiltrates, effusions, pneumothoraces, cardiomegaly or masses, awaiting formal interpretation from Radiologist at this time

## 2025-01-07 ENCOUNTER — PATIENT OUTREACH (OUTPATIENT)
Dept: CARE COORDINATION | Facility: CLINIC | Age: 35
End: 2025-01-07
Payer: COMMERCIAL

## 2025-01-21 ENCOUNTER — PATIENT OUTREACH (OUTPATIENT)
Dept: CARE COORDINATION | Facility: CLINIC | Age: 35
End: 2025-01-21
Payer: COMMERCIAL

## 2025-05-19 ENCOUNTER — RESULTS FOLLOW-UP (OUTPATIENT)
Dept: INTERNAL MEDICINE | Facility: CLINIC | Age: 35
End: 2025-05-19

## 2025-07-31 ENCOUNTER — VIRTUAL VISIT (OUTPATIENT)
Dept: OBGYN | Facility: CLINIC | Age: 35
End: 2025-07-31
Payer: COMMERCIAL

## 2025-07-31 DIAGNOSIS — O09.519 SUPERVISION OF HIGH-RISK PREGNANCY OF ELDERLY PRIMIGRAVIDA: Primary | ICD-10-CM

## 2025-07-31 RX ORDER — MAGNESIUM GLYCINATE 100 MG
200 CAPSULE ORAL 2 TIMES DAILY
COMMUNITY

## 2025-07-31 RX ORDER — VITAMIN A, VITAMIN C, VITAMIN D-3, VITAMIN E, VITAMIN B-1, VITAMIN B-2, NIACIN, VITAMIN B-6, CALCIUM, IRON, ZINC, COPPER 4000; 120; 400; 22; 1.84; 3; 20; 10; 1; 12; 200; 27; 25; 2 [IU]/1; MG/1; [IU]/1; MG/1; MG/1; MG/1; MG/1; MG/1; MG/1; UG/1; MG/1; MG/1; MG/1; MG/1
TABLET ORAL DAILY
COMMUNITY

## 2025-07-31 RX ORDER — FLUTICASONE PROPIONATE 50 MCG
1 SPRAY, SUSPENSION (ML) NASAL DAILY
COMMUNITY

## 2025-07-31 NOTE — NURSING NOTE
NPN nurse visit done over the phone. Pt will be given NPN folder and book at her upcoming appt.  Discussed optional screening available to assess chromosomal anomalies. Questions answered. Informed pt of the clinic structure (on-call does deliveries for the day, may be male or female doctor). Pt advised to call the clinic if she has any questions or concerns related to her pregnancy. Prenatal labs will be obtained at her upcoming appt. New prenatal visit scheduled on 9/10 with Dr. GREGORY Rubin.    6w3d    Menstrual cycles: regular  Date of positive pregnancy test: 7/23  Medications stopped upon pos HPT: acne meds    Last pap: 5/2025        Patient supplied answers from flow sheet for:  Prenatal OB Questionnaire.  Past Medical History  Have you ever recieved care for your mental health? : (!) Yes (anxiety)  Have you ever been in a major accident or suffered serious trauma?: (Patient-Rptd) No  Within the last year, has anyone hit, slapped, kicked or otherwise hurt you?: (Patient-Rptd) No  In the last year, has anyone forced you to have sex when you didn't want to?: (Patient-Rptd) No    Past Medical History 2   Have you ever received a blood transfusion?: (Patient-Rptd) No  Would you accept a blood transfusion if was medically recommended?: (Patient-Rptd) Yes  Does anyone in your home smoke?: (Patient-Rptd) No   Is your blood type Rh negative?: No  Have you ever ?: (Patient-Rptd) No  Have you been hospitalized for a nonsurgical reason excluding normal delivery?: (Patient-Rptd) No  Have you ever had an abnormal pap smear?: (!) (Patient-Rptd) Yes    Past Medical History (Continued)  Do you have a history of abnormalities of the uterus?: No  Did your mother take ANDRZEJ or any other hormones when she was pregnant with you?: No  Do you have any other problems we have not asked about which you feel may be important to this pregnancy?: (Patient-Rptd) No       MICHELLE White [facility]

## 2025-08-12 LAB
ABO + RH BLD: NORMAL
BLD GP AB SCN SERPL QL: NEGATIVE
SPECIMEN EXP DATE BLD: NORMAL

## 2025-08-13 ENCOUNTER — LAB (OUTPATIENT)
Dept: LAB | Facility: CLINIC | Age: 35
End: 2025-08-13

## 2025-08-13 ENCOUNTER — ANCILLARY PROCEDURE (OUTPATIENT)
Dept: ULTRASOUND IMAGING | Facility: CLINIC | Age: 35
End: 2025-08-13

## 2025-08-13 DIAGNOSIS — O09.519 SUPERVISION OF HIGH-RISK PREGNANCY OF ELDERLY PRIMIGRAVIDA: ICD-10-CM

## 2025-08-13 LAB
ERYTHROCYTE [DISTWIDTH] IN BLOOD BY AUTOMATED COUNT: 11.8 % (ref 10–15)
EST. AVERAGE GLUCOSE BLD GHB EST-MCNC: 100 MG/DL
HBA1C MFR BLD: 5.1 % (ref 0–5.6)
HCT VFR BLD AUTO: 37.8 % (ref 35–47)
HGB BLD-MCNC: 13.2 G/DL (ref 11.7–15.7)
MCH RBC QN AUTO: 32.3 PG (ref 26.5–33)
MCHC RBC AUTO-ENTMCNC: 34.9 G/DL (ref 31.5–36.5)
MCV RBC AUTO: 92.4 FL (ref 78–100)
PLATELET # BLD AUTO: 275 10E3/UL (ref 150–450)
RBC # BLD AUTO: 4.09 10E6/UL (ref 3.8–5.2)
WBC # BLD AUTO: 13.18 10E3/UL (ref 4–11)

## 2025-08-13 PROCEDURE — 85027 COMPLETE CBC AUTOMATED: CPT

## 2025-08-13 PROCEDURE — 86850 RBC ANTIBODY SCREEN: CPT

## 2025-08-13 PROCEDURE — 86900 BLOOD TYPING SEROLOGIC ABO: CPT

## 2025-08-13 PROCEDURE — 76817 TRANSVAGINAL US OBSTETRIC: CPT | Performed by: FAMILY MEDICINE

## 2025-08-13 PROCEDURE — 86803 HEPATITIS C AB TEST: CPT

## 2025-08-13 PROCEDURE — 76801 OB US < 14 WKS SINGLE FETUS: CPT | Performed by: FAMILY MEDICINE

## 2025-08-13 PROCEDURE — 36415 COLL VENOUS BLD VENIPUNCTURE: CPT

## 2025-08-13 PROCEDURE — 87340 HEPATITIS B SURFACE AG IA: CPT

## 2025-08-13 PROCEDURE — 87389 HIV-1 AG W/HIV-1&-2 AB AG IA: CPT

## 2025-08-13 PROCEDURE — 86780 TREPONEMA PALLIDUM: CPT

## 2025-08-13 PROCEDURE — 83036 HEMOGLOBIN GLYCOSYLATED A1C: CPT

## 2025-08-13 PROCEDURE — 86901 BLOOD TYPING SEROLOGIC RH(D): CPT

## 2025-08-14 LAB
HBV SURFACE AG SERPL QL IA: NONREACTIVE
HCV AB SERPL QL IA: NONREACTIVE
HIV 1+2 AB+HIV1 P24 AG SERPL QL IA: NONREACTIVE
T PALLIDUM AB SER QL: NONREACTIVE

## 2025-08-25 DIAGNOSIS — J45.30 MILD PERSISTENT ASTHMA WITHOUT COMPLICATION: ICD-10-CM

## 2025-08-26 RX ORDER — FLUTICASONE PROPIONATE AND SALMETEROL XINAFOATE 230; 21 UG/1; UG/1
2 AEROSOL, METERED RESPIRATORY (INHALATION) 2 TIMES DAILY
Qty: 12 G | Refills: 3 | Status: SHIPPED | OUTPATIENT
Start: 2025-08-26